# Patient Record
Sex: MALE | NOT HISPANIC OR LATINO | Employment: UNEMPLOYED | URBAN - METROPOLITAN AREA
[De-identification: names, ages, dates, MRNs, and addresses within clinical notes are randomized per-mention and may not be internally consistent; named-entity substitution may affect disease eponyms.]

---

## 2018-11-06 ENCOUNTER — RECORDS - HEALTHEAST (OUTPATIENT)
Dept: LAB | Facility: CLINIC | Age: 58
End: 2018-11-06

## 2018-11-06 LAB
ANION GAP SERPL CALCULATED.3IONS-SCNC: 9 MMOL/L (ref 5–18)
BASOPHILS # BLD AUTO: 0.1 THOU/UL (ref 0–0.2)
BASOPHILS NFR BLD AUTO: 1 % (ref 0–2)
BUN SERPL-MCNC: 36 MG/DL (ref 8–22)
CALCIUM SERPL-MCNC: 9.8 MG/DL (ref 8.5–10.5)
CHLORIDE BLD-SCNC: 103 MMOL/L (ref 98–107)
CO2 SERPL-SCNC: 26 MMOL/L (ref 22–31)
CREAT SERPL-MCNC: 2 MG/DL (ref 0.7–1.3)
EOSINOPHIL # BLD AUTO: 0.2 THOU/UL (ref 0–0.4)
EOSINOPHIL NFR BLD AUTO: 2 % (ref 0–6)
ERYTHROCYTE [DISTWIDTH] IN BLOOD BY AUTOMATED COUNT: 14.4 % (ref 11–14.5)
GFR SERPL CREATININE-BSD FRML MDRD: 34 ML/MIN/1.73M2
GLUCOSE BLD-MCNC: 111 MG/DL (ref 70–125)
HCT VFR BLD AUTO: 32.8 % (ref 40–54)
HGB BLD-MCNC: 10.4 G/DL (ref 14–18)
LYMPHOCYTES # BLD AUTO: 1.3 THOU/UL (ref 0.8–4.4)
LYMPHOCYTES NFR BLD AUTO: 14 % (ref 20–40)
MCH RBC QN AUTO: 30.9 PG (ref 27–34)
MCHC RBC AUTO-ENTMCNC: 31.7 G/DL (ref 32–36)
MCV RBC AUTO: 97 FL (ref 80–100)
MONOCYTES # BLD AUTO: 1 THOU/UL (ref 0–0.9)
MONOCYTES NFR BLD AUTO: 10 % (ref 2–10)
NEUTROPHILS # BLD AUTO: 6.9 THOU/UL (ref 2–7.7)
NEUTROPHILS NFR BLD AUTO: 73 % (ref 50–70)
PLATELET # BLD AUTO: 313 THOU/UL (ref 140–440)
PMV BLD AUTO: 10.8 FL (ref 8.5–12.5)
POTASSIUM BLD-SCNC: 5.9 MMOL/L (ref 3.5–5)
RBC # BLD AUTO: 3.37 MILL/UL (ref 4.4–6.2)
SODIUM SERPL-SCNC: 138 MMOL/L (ref 136–145)
WBC: 9.6 THOU/UL (ref 4–11)

## 2018-11-07 ENCOUNTER — RECORDS - HEALTHEAST (OUTPATIENT)
Dept: LAB | Facility: CLINIC | Age: 58
End: 2018-11-07

## 2018-11-07 LAB
ANION GAP SERPL CALCULATED.3IONS-SCNC: 13 MMOL/L (ref 5–18)
BUN SERPL-MCNC: 32 MG/DL (ref 8–22)
CALCIUM SERPL-MCNC: 9.9 MG/DL (ref 8.5–10.5)
CHLORIDE BLD-SCNC: 99 MMOL/L (ref 98–107)
CO2 SERPL-SCNC: 24 MMOL/L (ref 22–31)
CREAT SERPL-MCNC: 1.54 MG/DL (ref 0.7–1.3)
GFR SERPL CREATININE-BSD FRML MDRD: 46 ML/MIN/1.73M2
GLUCOSE BLD-MCNC: 184 MG/DL (ref 70–125)
POTASSIUM BLD-SCNC: 5.1 MMOL/L (ref 3.5–5)
SODIUM SERPL-SCNC: 136 MMOL/L (ref 136–145)

## 2018-11-09 ENCOUNTER — RECORDS - HEALTHEAST (OUTPATIENT)
Dept: LAB | Facility: CLINIC | Age: 58
End: 2018-11-09

## 2018-11-09 LAB
ANION GAP SERPL CALCULATED.3IONS-SCNC: 10 MMOL/L (ref 5–18)
BUN SERPL-MCNC: 26 MG/DL (ref 8–22)
CALCIUM SERPL-MCNC: 9.9 MG/DL (ref 8.5–10.5)
CHLORIDE BLD-SCNC: 103 MMOL/L (ref 98–107)
CO2 SERPL-SCNC: 24 MMOL/L (ref 22–31)
CREAT SERPL-MCNC: 1.3 MG/DL (ref 0.7–1.3)
GFR SERPL CREATININE-BSD FRML MDRD: 57 ML/MIN/1.73M2
GLUCOSE BLD-MCNC: 130 MG/DL (ref 70–125)
POTASSIUM BLD-SCNC: 4.9 MMOL/L (ref 3.5–5)
SODIUM SERPL-SCNC: 137 MMOL/L (ref 136–145)

## 2018-11-12 ENCOUNTER — RECORDS - HEALTHEAST (OUTPATIENT)
Dept: LAB | Facility: CLINIC | Age: 58
End: 2018-11-12

## 2018-11-12 LAB
ANION GAP SERPL CALCULATED.3IONS-SCNC: 7 MMOL/L (ref 5–18)
BUN SERPL-MCNC: 17 MG/DL (ref 8–22)
CALCIUM SERPL-MCNC: 9.6 MG/DL (ref 8.5–10.5)
CHLORIDE BLD-SCNC: 105 MMOL/L (ref 98–107)
CO2 SERPL-SCNC: 25 MMOL/L (ref 22–31)
CREAT SERPL-MCNC: 0.89 MG/DL (ref 0.7–1.3)
GFR SERPL CREATININE-BSD FRML MDRD: >60 ML/MIN/1.73M2
GLUCOSE BLD-MCNC: 119 MG/DL (ref 70–125)
POTASSIUM BLD-SCNC: 5.1 MMOL/L (ref 3.5–5)
SODIUM SERPL-SCNC: 137 MMOL/L (ref 136–145)

## 2018-11-15 ENCOUNTER — RECORDS - HEALTHEAST (OUTPATIENT)
Dept: LAB | Facility: CLINIC | Age: 58
End: 2018-11-15

## 2018-11-16 LAB
ANION GAP SERPL CALCULATED.3IONS-SCNC: 10 MMOL/L (ref 5–18)
BUN SERPL-MCNC: 32 MG/DL (ref 8–22)
CALCIUM SERPL-MCNC: 10 MG/DL (ref 8.5–10.5)
CHLORIDE BLD-SCNC: 101 MMOL/L (ref 98–107)
CO2 SERPL-SCNC: 24 MMOL/L (ref 22–31)
CREAT SERPL-MCNC: 1.24 MG/DL (ref 0.7–1.3)
GFR SERPL CREATININE-BSD FRML MDRD: 60 ML/MIN/1.73M2
GLUCOSE BLD-MCNC: 196 MG/DL (ref 70–125)
POTASSIUM BLD-SCNC: 4.7 MMOL/L (ref 3.5–5)
SODIUM SERPL-SCNC: 135 MMOL/L (ref 136–145)

## 2018-11-19 ENCOUNTER — RECORDS - HEALTHEAST (OUTPATIENT)
Dept: LAB | Facility: CLINIC | Age: 58
End: 2018-11-19

## 2018-11-19 LAB
ANION GAP SERPL CALCULATED.3IONS-SCNC: 8 MMOL/L (ref 5–18)
BUN SERPL-MCNC: 19 MG/DL (ref 8–22)
CALCIUM SERPL-MCNC: 9.9 MG/DL (ref 8.5–10.5)
CHLORIDE BLD-SCNC: 101 MMOL/L (ref 98–107)
CO2 SERPL-SCNC: 28 MMOL/L (ref 22–31)
CREAT SERPL-MCNC: 0.89 MG/DL (ref 0.7–1.3)
GFR SERPL CREATININE-BSD FRML MDRD: >60 ML/MIN/1.73M2
GLUCOSE BLD-MCNC: 182 MG/DL (ref 70–125)
POTASSIUM BLD-SCNC: 4.5 MMOL/L (ref 3.5–5)
SODIUM SERPL-SCNC: 137 MMOL/L (ref 136–145)

## 2019-08-14 ENCOUNTER — OFFICE VISIT (OUTPATIENT)
Dept: URGENT CARE | Facility: URGENT CARE | Age: 59
End: 2019-08-14
Payer: COMMERCIAL

## 2019-08-14 VITALS
OXYGEN SATURATION: 95 % | HEART RATE: 60 BPM | TEMPERATURE: 97.1 F | RESPIRATION RATE: 16 BRPM | BODY MASS INDEX: 42.74 KG/M2 | DIASTOLIC BLOOD PRESSURE: 94 MMHG | SYSTOLIC BLOOD PRESSURE: 204 MMHG | WEIGHT: 282 LBS | HEIGHT: 68 IN

## 2019-08-14 DIAGNOSIS — I16.1 HYPERTENSIVE EMERGENCY: Primary | ICD-10-CM

## 2019-08-14 PROCEDURE — 93000 ELECTROCARDIOGRAM COMPLETE: CPT | Performed by: PHYSICIAN ASSISTANT

## 2019-08-14 PROCEDURE — 99203 OFFICE O/P NEW LOW 30 MIN: CPT | Performed by: PHYSICIAN ASSISTANT

## 2019-08-14 RX ORDER — LANCETS 33 GAUGE
EACH MISCELLANEOUS
COMMUNITY
Start: 2019-05-09

## 2019-08-14 RX ORDER — FOLIC ACID 0.8 MG
1 TABLET ORAL
COMMUNITY
Start: 2016-10-12 | End: 2023-07-27

## 2019-08-14 RX ORDER — METFORMIN HCL 500 MG
1000 TABLET, EXTENDED RELEASE 24 HR ORAL
COMMUNITY
Start: 2019-08-06

## 2019-08-14 RX ORDER — CETIRIZINE HYDROCHLORIDE 10 MG/1
10 TABLET ORAL DAILY
COMMUNITY
Start: 2019-01-14

## 2019-08-14 RX ORDER — ALBUTEROL SULFATE 90 UG/1
2 AEROSOL, METERED RESPIRATORY (INHALATION) EVERY 6 HOURS PRN
COMMUNITY
Start: 2014-08-27

## 2019-08-14 RX ORDER — HYDROCHLOROTHIAZIDE 25 MG/1
25 TABLET ORAL
COMMUNITY
Start: 2011-10-28 | End: 2023-07-27

## 2019-08-14 RX ORDER — DILTIAZEM HYDROCHLORIDE 240 MG/1
240 CAPSULE, EXTENDED RELEASE ORAL EVERY EVENING
COMMUNITY
Start: 2019-08-06

## 2019-08-14 RX ORDER — MONTELUKAST SODIUM 10 MG/1
10 TABLET ORAL EVERY MORNING
COMMUNITY
Start: 2019-08-06

## 2019-08-14 ASSESSMENT — MIFFLIN-ST. JEOR: SCORE: 2063.64

## 2019-08-14 NOTE — PROGRESS NOTES
"Patient presents with:  Hypertension: pt noticed he had high blood pressure this morning at 190/100. Pt had a stroke 5 years ago. Pt is also feeling lightheaded    Did not take his diltiazem last night.      He has a headache and he feels off balance.  \"Feels different\"      SUBJECTIVE:   Vargas Dent is a 59 year old male presenting with a chief complaint of:  1) patient sent to  by his home health nurse for 200/104 blood pressure this morning.   2) he complains of a frontal headache and dizziness with an increase in his underlying balance problems.    3) some shortness of breath but no chest pressure or pain.     Past Medical History:   Diagnosis Date     CVA (cerebral infarction)      Hyperlipidemia      Hypertension      Patient Active Problem List   Diagnosis     HTN (hypertension)     Hyperlipidemia     Cerebral artery occlusion with cerebral infarction (H)     Hemiplegia of nondominant side, late effect of cerebrovascular disease (H)     Facial weakness due to cerebrovascular disease(438.83)     Dysarthria as late effect of cerebrovascular disease     Diplopia     Pain in joint, pelvic region and thigh     Disturbances of vision, late effect of cerebrovascular disease     Pain in joint, lower leg     Hiccough     Physical deconditioning     Hiccup     Cerebral infarction (H)     Constipation     Dysphagia     DJD (degenerative joint disease) of knee     DDD (degenerative disc disease)     Allergic rhinitis     Social History     Tobacco Use     Smoking status: Never Smoker     Smokeless tobacco: Never Used   Substance Use Topics     Alcohol use: Not on file       ROS:  CONSTITUTIONAL:NEGATIVE for fever, chills, change in weight  INTEGUMENTARY/SKIN: NEGATIVE for worrisome rashes, moles or lesions  ENT/MOUTH: NEGATIVE for ear, mouth and throat problems  RESP:as above  CV: NEGATIVE for chest pain, palpitations or peripheral edema  NEURO: as above  Review of systems negative except as stated " "above.    OBJECTIVE  :BP (!) 204/94 (BP Location: Right arm, Patient Position: Sitting, Cuff Size: Adult Regular)   Pulse 60   Temp 97.1  F (36.2  C) (Oral)   Resp 16   Ht 1.727 m (5' 8\")   Wt 127.9 kg (282 lb)   SpO2 95%   BMI 42.88 kg/m    GENERAL APPEARANCE: healthy, alert and no distress  RESP: lungs clear to auscultation - no rales, rhonchi or wheezes  CV: regular rates and rhythm, normal S1 S2, no murmur noted  NEURO: Normal strength and tone, sensory exam grossly normal,  normal speech and mentation  SKIN: no suspicious lesions or rashes     (I16.1) Hypertensive emergency  (primary encounter diagnosis)  Comment: with headache and dizziness, concern for CVA  Plan: EKG 12-lead complete w/read - Clinics        Patient to be evaluated further in ED, transport by ambulance.      Patient expresses understanding and agreement with the assessment and plan as above.      "

## 2023-07-27 ENCOUNTER — APPOINTMENT (OUTPATIENT)
Dept: CT IMAGING | Facility: CLINIC | Age: 63
End: 2023-07-27
Attending: EMERGENCY MEDICINE
Payer: COMMERCIAL

## 2023-07-27 ENCOUNTER — HOSPITAL ENCOUNTER (OUTPATIENT)
Facility: CLINIC | Age: 63
Setting detail: OBSERVATION
Discharge: HOME | End: 2023-07-30
Attending: EMERGENCY MEDICINE | Admitting: HOSPITALIST
Payer: COMMERCIAL

## 2023-07-27 ENCOUNTER — APPOINTMENT (OUTPATIENT)
Dept: MRI IMAGING | Facility: CLINIC | Age: 63
End: 2023-07-27
Attending: EMERGENCY MEDICINE
Payer: COMMERCIAL

## 2023-07-27 DIAGNOSIS — S22.089A CLOSED FRACTURE OF ELEVENTH THORACIC VERTEBRA, UNSPECIFIED FRACTURE MORPHOLOGY, INITIAL ENCOUNTER (H): ICD-10-CM

## 2023-07-27 DIAGNOSIS — K59.00 CONSTIPATION, UNSPECIFIED CONSTIPATION TYPE: Primary | ICD-10-CM

## 2023-07-27 DIAGNOSIS — S22.089A CLOSED FRACTURE OF TWELFTH THORACIC VERTEBRA, UNSPECIFIED FRACTURE MORPHOLOGY, INITIAL ENCOUNTER (H): ICD-10-CM

## 2023-07-27 DIAGNOSIS — R07.9 CHEST PAIN, UNSPECIFIED TYPE: ICD-10-CM
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
## 2023-07-27 LAB
ALBUMIN SERPL BCG-MCNC: 4.1 G/DL (ref 3.5–5.2)
ALP SERPL-CCNC: 95 U/L (ref 40–129)
ALT SERPL W P-5'-P-CCNC: 14 U/L (ref 0–70)
ANION GAP SERPL CALCULATED.3IONS-SCNC: 15 MMOL/L (ref 7–15)
AST SERPL W P-5'-P-CCNC: 11 U/L (ref 0–45)
ATRIAL RATE - MUSE: 73 BPM
BASOPHILS # BLD AUTO: 0 10E3/UL (ref 0–0.2)
BASOPHILS NFR BLD AUTO: 0 %
BILIRUB SERPL-MCNC: 0.6 MG/DL
BUN SERPL-MCNC: 19.9 MG/DL (ref 8–23)
CALCIUM SERPL-MCNC: 8.8 MG/DL (ref 8.8–10.2)
CHLORIDE SERPL-SCNC: 102 MMOL/L (ref 98–107)
CREAT SERPL-MCNC: 1.11 MG/DL (ref 0.67–1.17)
DEPRECATED HCO3 PLAS-SCNC: 18 MMOL/L (ref 22–29)
DIASTOLIC BLOOD PRESSURE - MUSE: NORMAL MMHG
EOSINOPHIL # BLD AUTO: 0.4 10E3/UL (ref 0–0.7)
EOSINOPHIL NFR BLD AUTO: 4 %
ERYTHROCYTE [DISTWIDTH] IN BLOOD BY AUTOMATED COUNT: 14.6 % (ref 10–15)
GFR SERPL CREATININE-BSD FRML MDRD: 75 ML/MIN/1.73M2
GLUCOSE SERPL-MCNC: 189 MG/DL (ref 70–99)
HCT VFR BLD AUTO: 38 % (ref 40–53)
HGB BLD-MCNC: 12.4 G/DL (ref 13.3–17.7)
HOLD SPECIMEN: NORMAL
HOLD SPECIMEN: NORMAL
IMM GRANULOCYTES # BLD: 0 10E3/UL
IMM GRANULOCYTES NFR BLD: 0 %
INTERPRETATION ECG - MUSE: NORMAL
LYMPHOCYTES # BLD AUTO: 1.1 10E3/UL (ref 0.8–5.3)
LYMPHOCYTES NFR BLD AUTO: 11 %
MCH RBC QN AUTO: 30.4 PG (ref 26.5–33)
MCHC RBC AUTO-ENTMCNC: 32.6 G/DL (ref 31.5–36.5)
MCV RBC AUTO: 93 FL (ref 78–100)
MONOCYTES # BLD AUTO: 1.4 10E3/UL (ref 0–1.3)
MONOCYTES NFR BLD AUTO: 14 %
NEUTROPHILS # BLD AUTO: 6.9 10E3/UL (ref 1.6–8.3)
NEUTROPHILS NFR BLD AUTO: 71 %
NRBC # BLD AUTO: 0 10E3/UL
NRBC BLD AUTO-RTO: 0 /100
P AXIS - MUSE: 10 DEGREES
PLATELET # BLD AUTO: 197 10E3/UL (ref 150–450)
POTASSIUM SERPL-SCNC: 4.2 MMOL/L (ref 3.4–5.3)
PR INTERVAL - MUSE: 220 MS
PROT SERPL-MCNC: 7.3 G/DL (ref 6.4–8.3)
QRS DURATION - MUSE: 84 MS
QT - MUSE: 384 MS
QTC - MUSE: 423 MS
R AXIS - MUSE: 30 DEGREES
RADIOLOGIST FLAGS: ABNORMAL
RADIOLOGIST FLAGS: ABNORMAL
RBC # BLD AUTO: 4.08 10E6/UL (ref 4.4–5.9)
SODIUM SERPL-SCNC: 135 MMOL/L (ref 136–145)
SYSTOLIC BLOOD PRESSURE - MUSE: NORMAL MMHG
T AXIS - MUSE: 11 DEGREES
TROPONIN T SERPL HS-MCNC: 16 NG/L
TROPONIN T SERPL HS-MCNC: 18 NG/L
VENTRICULAR RATE- MUSE: 73 BPM
WBC # BLD AUTO: 9.9 10E3/UL (ref 4–11)

## 2023-07-27 PROCEDURE — G0378 HOSPITAL OBSERVATION PER HR: HCPCS

## 2023-07-27 PROCEDURE — 999N000104 CT LUMBAR SPINE RECONSTRUCTED

## 2023-07-27 PROCEDURE — 74177 CT ABD & PELVIS W/CONTRAST: CPT

## 2023-07-27 PROCEDURE — 80053 COMPREHEN METABOLIC PANEL: CPT | Performed by: EMERGENCY MEDICINE

## 2023-07-27 PROCEDURE — 96376 TX/PRO/DX INJ SAME DRUG ADON: CPT | Mod: XU

## 2023-07-27 PROCEDURE — 250N000009 HC RX 250: Performed by: EMERGENCY MEDICINE

## 2023-07-27 PROCEDURE — 93005 ELECTROCARDIOGRAM TRACING: CPT

## 2023-07-27 PROCEDURE — 84484 ASSAY OF TROPONIN QUANT: CPT | Performed by: EMERGENCY MEDICINE

## 2023-07-27 PROCEDURE — 72125 CT NECK SPINE W/O DYE: CPT

## 2023-07-27 PROCEDURE — 36415 COLL VENOUS BLD VENIPUNCTURE: CPT | Performed by: EMERGENCY MEDICINE

## 2023-07-27 PROCEDURE — 96376 TX/PRO/DX INJ SAME DRUG ADON: CPT

## 2023-07-27 PROCEDURE — 250N000013 HC RX MED GY IP 250 OP 250 PS 637: Performed by: HOSPITALIST

## 2023-07-27 PROCEDURE — 85025 COMPLETE CBC W/AUTO DIFF WBC: CPT | Performed by: EMERGENCY MEDICINE

## 2023-07-27 PROCEDURE — 250N000011 HC RX IP 250 OP 636: Performed by: EMERGENCY MEDICINE

## 2023-07-27 PROCEDURE — 96374 THER/PROPH/DIAG INJ IV PUSH: CPT

## 2023-07-27 PROCEDURE — 99203 OFFICE O/P NEW LOW 30 MIN: CPT | Performed by: NURSE PRACTITIONER

## 2023-07-27 PROCEDURE — 999N000157 HC STATISTIC RCP TIME EA 10 MIN

## 2023-07-27 PROCEDURE — 999N000104 CT THORACIC SPINE RECONSTRUCTED

## 2023-07-27 PROCEDURE — 99223 1ST HOSP IP/OBS HIGH 75: CPT | Performed by: HOSPITALIST

## 2023-07-27 PROCEDURE — 72146 MRI CHEST SPINE W/O DYE: CPT

## 2023-07-27 PROCEDURE — 94660 CPAP INITIATION&MGMT: CPT

## 2023-07-27 RX ORDER — BISACODYL 10 MG
10 SUPPOSITORY, RECTAL RECTAL DAILY PRN
Status: DISCONTINUED | OUTPATIENT
Start: 2023-07-27 | End: 2023-07-30 | Stop reason: HOSPADM

## 2023-07-27 RX ORDER — IOPAMIDOL 755 MG/ML
135 INJECTION, SOLUTION INTRAVASCULAR ONCE
Status: COMPLETED | OUTPATIENT
Start: 2023-07-27 | End: 2023-07-27

## 2023-07-27 RX ORDER — PREGABALIN 150 MG/1
150 CAPSULE ORAL 3 TIMES DAILY
COMMUNITY

## 2023-07-27 RX ORDER — AMOXICILLIN 250 MG
1 CAPSULE ORAL 2 TIMES DAILY
Status: DISCONTINUED | OUTPATIENT
Start: 2023-07-27 | End: 2023-07-30 | Stop reason: HOSPADM

## 2023-07-27 RX ORDER — IPRATROPIUM BROMIDE 42 UG/1
2 SPRAY, METERED NASAL 2 TIMES DAILY
COMMUNITY

## 2023-07-27 RX ORDER — ACETAMINOPHEN 325 MG/1
975 TABLET ORAL EVERY 8 HOURS
Status: DISCONTINUED | OUTPATIENT
Start: 2023-07-27 | End: 2023-07-30 | Stop reason: HOSPADM

## 2023-07-27 RX ORDER — ONDANSETRON 4 MG/1
4 TABLET, ORALLY DISINTEGRATING ORAL EVERY 6 HOURS PRN
Status: DISCONTINUED | OUTPATIENT
Start: 2023-07-27 | End: 2023-07-30 | Stop reason: HOSPADM

## 2023-07-27 RX ORDER — LIDOCAINE 4 G/G
1 PATCH TOPICAL
Status: DISCONTINUED | OUTPATIENT
Start: 2023-07-28 | End: 2023-07-30 | Stop reason: HOSPADM

## 2023-07-27 RX ORDER — CYCLOBENZAPRINE HCL 10 MG
10 TABLET ORAL EVERY 8 HOURS PRN
Status: DISCONTINUED | OUTPATIENT
Start: 2023-07-27 | End: 2023-07-30 | Stop reason: HOSPADM

## 2023-07-27 RX ORDER — ONDANSETRON 2 MG/ML
4 INJECTION INTRAMUSCULAR; INTRAVENOUS EVERY 6 HOURS PRN
Status: DISCONTINUED | OUTPATIENT
Start: 2023-07-27 | End: 2023-07-30 | Stop reason: HOSPADM

## 2023-07-27 RX ORDER — NALOXONE HYDROCHLORIDE 0.4 MG/ML
0.2 INJECTION, SOLUTION INTRAMUSCULAR; INTRAVENOUS; SUBCUTANEOUS
Status: DISCONTINUED | OUTPATIENT
Start: 2023-07-27 | End: 2023-07-30 | Stop reason: HOSPADM

## 2023-07-27 RX ORDER — AMOXICILLIN 250 MG
2 CAPSULE ORAL 2 TIMES DAILY
Status: DISCONTINUED | OUTPATIENT
Start: 2023-07-27 | End: 2023-07-30 | Stop reason: HOSPADM

## 2023-07-27 RX ORDER — HYDROMORPHONE HCL IN WATER/PF 6 MG/30 ML
0.2 PATIENT CONTROLLED ANALGESIA SYRINGE INTRAVENOUS
Status: DISCONTINUED | OUTPATIENT
Start: 2023-07-27 | End: 2023-07-30 | Stop reason: HOSPADM

## 2023-07-27 RX ORDER — NALOXONE HYDROCHLORIDE 0.4 MG/ML
0.4 INJECTION, SOLUTION INTRAMUSCULAR; INTRAVENOUS; SUBCUTANEOUS
Status: DISCONTINUED | OUTPATIENT
Start: 2023-07-27 | End: 2023-07-30 | Stop reason: HOSPADM

## 2023-07-27 RX ORDER — ASPIRIN 325 MG
325 TABLET ORAL DAILY
COMMUNITY

## 2023-07-27 RX ORDER — HYDRALAZINE HYDROCHLORIDE 20 MG/ML
10 INJECTION INTRAMUSCULAR; INTRAVENOUS EVERY 4 HOURS PRN
Status: DISCONTINUED | OUTPATIENT
Start: 2023-07-27 | End: 2023-07-30 | Stop reason: HOSPADM

## 2023-07-27 RX ORDER — TIOTROPIUM BROMIDE 18 UG/1
18 CAPSULE ORAL; RESPIRATORY (INHALATION) DAILY
COMMUNITY

## 2023-07-27 RX ORDER — LOSARTAN POTASSIUM 100 MG/1
100 TABLET ORAL DAILY
COMMUNITY

## 2023-07-27 RX ADMIN — HYDROMORPHONE HYDROCHLORIDE 1 MG: 1 INJECTION, SOLUTION INTRAMUSCULAR; INTRAVENOUS; SUBCUTANEOUS at 06:56

## 2023-07-27 RX ADMIN — SODIUM CHLORIDE 80 ML: 9 INJECTION, SOLUTION INTRAVENOUS at 07:31

## 2023-07-27 RX ADMIN — IOPAMIDOL 135 ML: 755 INJECTION, SOLUTION INTRAVENOUS at 07:29

## 2023-07-27 RX ADMIN — OXYCODONE HYDROCHLORIDE 2.5 MG: 5 TABLET ORAL at 21:08

## 2023-07-27 RX ADMIN — HYDROMORPHONE HYDROCHLORIDE 1 MG: 1 INJECTION, SOLUTION INTRAMUSCULAR; INTRAVENOUS; SUBCUTANEOUS at 12:08

## 2023-07-27 RX ADMIN — CYCLOBENZAPRINE 10 MG: 10 TABLET, FILM COATED ORAL at 16:31

## 2023-07-27 RX ADMIN — OXYCODONE HYDROCHLORIDE 2.5 MG: 5 TABLET ORAL at 16:31

## 2023-07-27 RX ADMIN — SENNOSIDES AND DOCUSATE SODIUM 1 TABLET: 50; 8.6 TABLET ORAL at 20:56

## 2023-07-27 ASSESSMENT — ACTIVITIES OF DAILY LIVING (ADL)
ADLS_ACUITY_SCORE: 33
ADLS_ACUITY_SCORE: 35
ADLS_ACUITY_SCORE: 35
ADLS_ACUITY_SCORE: 33
ADLS_ACUITY_SCORE: 33
ADLS_ACUITY_SCORE: 35
ADLS_ACUITY_SCORE: 33
ADLS_ACUITY_SCORE: 33
ADLS_ACUITY_SCORE: 35

## 2023-07-27 NOTE — PHARMACY-ADMISSION MEDICATION HISTORY
Pharmacist Admission Medication History    Admission medication history is complete. The information provided in this note is only as accurate as the sources available at the time of the update.    Medication reconciliation/reorder completed by provider prior to medication history? No    Information Source(s): Patient and CareEverywhere/SureScripts via in-person    Pertinent Information: None    Changes made to PTA medication list:  Added: Pregabalin, Ipratropium Nasal spray, Spiriva Inhaler,   Deleted: amlodipine, atorvastatin, carvedilol, gabapentin, hydrochlorothiazide, losartan, naproxen, pantoprazole, oxycodone, miralax, senns  Changed: Aspirin to 325 mg daily, Metformin to 1000 mg daily      Allergies reviewed with patient and updates made in EHR:  Done by RN    Medication History Completed By: Gayla Galeano [facility] 7/27/2023 10:30 AM    Prior to Admission medications    Medication Sig Last Dose Taking? Auth Provider Long Term End Date   ACETAMINOPHEN PO Take 650 mg by mouth every 4 hours as needed for pain PRN Yes Reported, Patient     albuterol (PROAIR HFA/PROVENTIL HFA/VENTOLIN HFA) 108 (90 Base) MCG/ACT inhaler Inhale 2 puffs into the lungs every 6 hours as needed PRN Yes Reported, Patient Yes    aspirin (ASA) 325 MG tablet Take 325 mg by mouth daily 7/26/2023 at took x 3 Yes Unknown, Entered By History     cetirizine (ZYRTEC) 10 MG tablet Take 10 mg by mouth daily 7/26/2023 Yes Reported, Patient     diltiazem ER (DILT-XR) 240 MG 24 hr ER beaded capsule Take 240 mg by mouth every evening 7/26/2023 Yes Reported, Patient Yes    fluticasone (FLONASE) 50 MCG/ACT nasal spray Spray 1 spray into both nostrils 2 times daily 7/26/2023 Yes Reported, Patient     ipratropium (ATROVENT) 0.06 % nasal spray Spray 2 sprays into both nostrils 2 times daily  Yes Unknown, Entered By History     losartan (COZAAR) 100 MG tablet Take 100 mg by mouth daily 7/26/2023 Yes Unknown, Entered By History No    metFORMIN  (GLUCOPHAGE-XR) 500 MG 24 hr tablet Take 1,000 mg by mouth daily (with dinner) 7/26/2023 Yes Reported, Patient Yes    mometasone-formoterol (DULERA) 200-5 MCG/ACT inhaler Inhale 2 puffs into the lungs 2 times daily 7/26/2023 Yes Reported, Patient Yes    montelukast (SINGULAIR) 10 MG tablet Take 10 mg by mouth every morning 7/26/2023 Yes Reported, Patient Yes    pregabalin (LYRICA) 150 MG capsule Take 150 mg by mouth 3 times daily 7/26/2023 Yes Unknown, Entered By History Yes    tiotropium (SPIRIVA) 18 MCG inhaled capsule Inhale 18 mcg into the lungs daily  Yes Unknown, Entered By History Yes    blood glucose (NO BRAND SPECIFIED) test strip Dispense one touch ultra strips. Test 2 times per day ** Pharmacy allowed to substitute per Patients Insurance.   Reported, Patient     ONETOUCH DELICA LANCETS 33G MISC Dispense lancets to fit device. Test 2 times per day ** Pharmacy allowed to substitute per patient's insurance.   Reported, Patient

## 2023-07-27 NOTE — CONSULTS
[facility]    Neurosurgery Consultation     Date of Admission:  7/27/2023  Date of Consult (When I saw the patient): 07/27/23    Assessment & Plan   Vargas Dent is a 62 year old male who was admitted on 7/27/2023 with back pain s/p fall. Radiographic findings include a T12 vertebral body fracture extending to the right T11 facet and T11 bilateral lamina.       Plan:   - Orthotics consult for TLSO brace    - Recumbent and upright XR   - Avoid heavy lifting, bending, twisting  - Continue pain control measures as needed  - Appreciate assistance from specialties       I have discussed the following assessment and plan with Dr. Salcido.     Ruth Marx CNP  [facility]  [facility]  [address]  [place] [address]  Tel [phone]  Pager [phone]    Code Status    Full Code    Reason for Consult   Reason for consult: I was asked by Dr. Green to evaluate this patient for fall with T11-12 fracture.    Primary Care Physician   Tabitha Montoya    Chief Complaint   Fall, back pain     History is obtained from the patient and electronic health record    History of Present Illness   Vargas Dent is a 62 year old male who presents with low back pain s/p mechanical fall in the garage 2 days ago. He developed  low back pain that radiates to left sided abdomen and mild pain in left groin. Pain increases with activity. He denies numbness, saddle anesthesia, bowel/bladder changes, or new weakness. He reports baseline  left leg weakness s/p stroke in 2014.     CT THORACIC SPINE RECONSTRUCTED,   CT LUMBAR SPINE RECONSTRUCTED  7/27/2023 7:49 AM   IMPRESSION:  1. Three column injury with fractures involving the T12 vertebral  body, extending to the right T11 facet and T11 bilateral lamina. No  retropulsed fragments into the spinal canal. No high-grade canal or  neural foraminal stenosis.  2. No fracture or traumatic subluxation of the lumbar  spine.  Multilevel lumbar spondylosis. No high-grade canal stenosis.  3. See same day/same time as CT of the chest, abdomen and pelvis for  those images and report.    MR THORACIC SPINE W/O CONTRAST 7/27/2023 12:41 PM   IMPRESSION: Redemonstration of three column T12 vertebral body and T11  posterior element fractures. No cord contusion.       Past Medical History   I have reviewed this patient's medical history and updated it with pertinent information if needed.   Past Medical History:   Diagnosis Date    CVA (cerebral infarction)     Hyperlipidemia     Hypertension        Past Surgical History   I have reviewed this patient's surgical history and updated it with pertinent information if needed.  No past surgical history on file.    Prior to Admission Medications   Prior to Admission Medications   Prescriptions Last Dose Informant Patient Reported? Taking?   ACETAMINOPHEN PO PRN  Yes Yes   Sig: Take 650 mg by mouth every 4 hours as needed for pain   ONETOUCH DELICA LANCETS 33G MISC   Yes No   Sig: Dispense lancets to fit device. Test 2 times per day ** Pharmacy allowed to substitute per patient's insurance.   albuterol (PROAIR HFA/PROVENTIL HFA/VENTOLIN HFA) 108 (90 Base) MCG/ACT inhaler PRN  Yes Yes   Sig: Inhale 2 puffs into the lungs every 6 hours as needed   aspirin (ASA) 325 MG tablet 7/26/2023 at took x 3  Yes Yes   Sig: Take 325 mg by mouth daily   blood glucose (NO BRAND SPECIFIED) test strip   Yes No   Sig: Dispense one touch ultra strips. Test 2 times per day ** Pharmacy allowed to substitute per Patients Insurance.   cetirizine (ZYRTEC) 10 MG tablet 7/26/2023  Yes Yes   Sig: Take 10 mg by mouth daily   diltiazem ER (DILT-XR) 240 MG 24 hr ER beaded capsule 7/26/2023  Yes Yes   Sig: Take 240 mg by mouth every evening   fluticasone (FLONASE) 50 MCG/ACT nasal spray 7/26/2023  Yes Yes   Sig: Spray 1 spray into both nostrils 2 times daily   ipratropium (ATROVENT) 0.06 % nasal spray   Yes Yes   Sig: Spray 2  "sprays into both nostrils 2 times daily   losartan (COZAAR) 100 MG tablet 7/26/2023  Yes Yes   Sig: Take 100 mg by mouth daily   metFORMIN (GLUCOPHAGE-XR) 500 MG 24 hr tablet 7/26/2023  Yes Yes   Sig: Take 1,000 mg by mouth daily (with dinner)   mometasone-formoterol (DULERA) 200-5 MCG/ACT inhaler 7/26/2023  Yes Yes   Sig: Inhale 2 puffs into the lungs 2 times daily   montelukast (SINGULAIR) 10 MG tablet 7/26/2023  Yes Yes   Sig: Take 10 mg by mouth every morning   pregabalin (LYRICA) 150 MG capsule 7/26/2023  Yes Yes   Sig: Take 150 mg by mouth 3 times daily   tiotropium (SPIRIVA) 18 MCG inhaled capsule   Yes Yes   Sig: Inhale 18 mcg into the lungs daily      Facility-Administered Medications: None     Allergies   Allergies   Allergen Reactions    Beta Adrenergic Blockers Other (See Comments)     Allergy injections       Social History   I have reviewed this patient's social history and updated it with pertinent information if needed. Vargas Dent  reports that he has never smoked. He has never used smokeless tobacco.    Family History   I have reviewed this patient's family history and updated it with pertinent information if needed.   No family history on file.    Review of Systems   10 point ROS negative other than symptoms noted in HPI.    Physical Exam   Temp: 97.5  F (36.4  C) Temp src: Oral BP: 117/71 Pulse: 80   Resp: 18 SpO2: 95 % O2 Device: None (Room air)    Vital Signs with Ranges  Temp:  [97.5  F (36.4  C)-99.1  F (37.3  C)] 97.5  F (36.4  C)  Pulse:  [66-80] 80  Resp:  [12-18] 18  BP: (117-138)/(71-91) 117/71  SpO2:  [93 %-95 %] 95 %  280 lbs 0 oz     , Blood pressure 117/71, pulse 80, temperature 97.5  F (36.4  C), temperature source Oral, resp. rate 18, height 1.702 m (5' 7\"), weight 127 kg (280 lb), SpO2 95 %.  280 lbs 0 oz      NEUROLOGICAL EXAMINATION:   Mental status:  Alert and Oriented x 3, speech is fluent.  Cranial nerves:  II-XII intact.   Motor:    Baseline left hip and knee weakness " due to prior stroke   Able to lift left leg off the bed    RLE 5/5  Bilateral DF/PF 5/5    Sensation:  Intact to light touch   Reflexes:   Negative Clonus    Examination reveals tenderness to palpation of lower thoracic spine.   Straight leg raise is negative bilaterally.       Data     CBC RESULTS:   Recent Labs   Lab Test 07/27/23  0653   WBC 9.9   RBC 4.08*   HGB 12.4*   HCT 38.0*   MCV 93   MCH 30.4   MCHC 32.6   RDW 14.6        Basic Metabolic Panel:  Lab Results   Component Value Date     07/27/2023      Lab Results   Component Value Date    POTASSIUM 4.2 07/27/2023    POTASSIUM 4.5 11/19/2018     Lab Results   Component Value Date    CHLORIDE 102 07/27/2023    CHLORIDE 101 11/19/2018     Lab Results   Component Value Date    HARIKA 8.8 07/27/2023     Lab Results   Component Value Date    CO2 18 07/27/2023    CO2 28 11/19/2018     Lab Results   Component Value Date    BUN 19.9 07/27/2023    BUN 19 11/19/2018     Lab Results   Component Value Date    CR 1.11 07/27/2023     Lab Results   Component Value Date     07/27/2023     11/19/2018     INR:  No results found for: INR

## 2023-07-27 NOTE — PROGRESS NOTES
Orientation/Cognitive: A&O hx CVA. Baseline deficit with balance and L side weaker. Pt states R side has less feeling, and slightly garbled speech  Observation Goals (Met/ Not Met): not met  Mobility Level/Assist Equipment: bedrest  Fall Risk (Y/N): Y  Behavior Concerns: N  Pain Management: oxycodone, flexeril, dilaudid  Tele/VS/O2: VSS on RA. cpap  ABNL Lab/BG: Na   Diet: reg  Bowel/Bladder: cont   Skin Concerns: small abrasion R hip open to air  Drains/Devices: no  Tests/Procedures for next shift: thoracic xray. Neurosurg. Orthosis. PT SW  Anticipated DC date & active delays: tbd  Patient Stated Goal for Today: pain relief

## 2023-07-27 NOTE — ED NOTES
"[facility]  ED Nurse Handoff Report    ED Chief complaint: Fall (Pt fell on Tuesday and fell into a wall. Now c/o LLQ abd pain. Concerned that he may be septic.)      ED Diagnosis:   Final diagnoses:   None       Code Status: not addressed     Allergies:   Allergies   Allergen Reactions    Beta Adrenergic Blockers Other (See Comments)     Allergy injections       Patient Story: Pt tripped fell on Tuesday and landed forward. Pt c/o pain almost all over. Concerned that he ruptured his bowel and being septic.     Focused Assessment:  as above     Treatments and/or interventions provided: dilaudid, bedrest   Patient's response to treatments and/or interventions:     To be done/followed up on inpatient unit:  unknown     Does this patient have any cognitive concerns?:  no    Activity level - Baseline/Home:  Independent  Activity Level - Current:    bedrest     Patient's Preferred language: English   Needed?: No    Isolation: None  Infection: Not Applicable  Patient tested for COVID 19 prior to admission: NO  Bariatric?: No    Vital Signs:   Vitals:    07/27/23 0535 07/27/23 0540   BP: 117/84    Pulse: 78    Resp: 18    Temp:  98  F (36.7  C)   TempSrc:  Oral   SpO2: 94%    Weight: 127 kg (280 lb)    Height: 1.702 m (5' 7\")        Cardiac Rhythm:     Was the PSS-3 completed:   Yes  What interventions are required if any?               Family Comments:   OBS brochure/video discussed/provided to patient/family:               Name of person given brochure if not patient:               Relationship to patient:     For the majority of the shift this patient's behavior was .   Behavioral interventions performed were .    ED NURSE PHONE NUMBER: [phone]        "

## 2023-07-27 NOTE — ED PROVIDER NOTES
"  History     Chief Complaint:  Fall     The history is provided by the patient.   Vargas Dent is a 62 year old male with history of sepsis, CKD, hypertension, and CVA who presents to the ED for evaluation of a fall. The patient reports he tripped over a garden hose two days ago, falling face forward onto his chest and \"scorpioning\" with his legs coming up over him. He did not lose consciousness with the fall. Since the fall he has had lower back pain with radiation into his left leg and testicle, rated 3/10, as well as left lower abdominal pain. Today he had an episode of dull left chest pain, lasting five minutes, lightheadedness, and fatigue that he states is similar to previous episode of sepsis. The patient denies vomiting, urinary symptoms, head injury or loss of consciousness.    Independent Historian:   None - Patient Only    Review of External Notes:       Medications:    Aspirin 325 mg  Naprosyn  Vitamin D3  Glucophage  Cozaar  Miralax  Lipitor  Zyrtec  Singulair  Dulera  Flonase  Atrovent  Sudafed  Lyrica  Spiriva  Ventolin    Past Medical History:    Hypertension  Hyperlipidemia  CVA  TIA  Photophobia in bilateral eyes  Myopia of both eyes with astigmatism  Stage 2 CKD  Asthma  Class 3 Obesity  Sebirrhea  Dyslipidemia  Prediabetes  Vitamin D deficiency  Constipation  Internal Hemorrhoids  Dysphagia  Cataracts, bilateral  Diplopia  Diverticulosis  Allergic Rhinitis  Alcohol abuse  Major neurocognitive disorder  Adenomatous polyp of colon  Osteoarthritis  ARTHUR  Carpal tunnel of right wrist    Past Surgical History:    Tonsillectomy  Appendectomy     Physical Exam   Patient Vitals for the past 24 hrs:   BP Temp Temp src Pulse Resp SpO2 Height Weight   07/27/23 0540 -- 98  F (36.7  C) Oral -- -- -- -- --   07/27/23 0535 117/84 -- -- 78 18 94 % 1.702 m (5' 7\") 127 kg (280 lb)        Physical Exam  Constitutional: Well appearing.  HEENT: Atraumatic.  PERRL.  EOMI.  Moist mucous membranes.  Neck: Soft.  " Supple.  Mild midline tenderness to palpation of the inferior cervical spine.  Cardiac: Regular rate and rhythm.  No murmur or rub.  Respiratory: Clear to auscultation bilaterally.  No respiratory distress.    Abdomen: Soft and nontender.  Nondistended.  Musculoskeletal: There is tenderness of the inferior thoracic midline spine but no bony step-off.  No edema.  Normal range of motion.  Neurologic: Alert and oriented x3.  Normal tone and bulk.  Cranial nerves II through XII intact.  5/5 strength in bilateral upper and right lower extremities.  4/5 strength in the left leg.  Sensation to light touch intact throughout.    Skin: No rashes.  No edema.  Psych: Normal affect.  Normal behavior.        Emergency Department Course   ECG  ECG taken at 0705, ECG read at 0710  No STEMI  Sinus rhythm with sinus arrhythmia with 1st degree AV block  Septal Infarct, age undetermined  Abnormal ECG   No change as compared to prior, dated 8/14/19.  Rate 73 bpm. DC interval 220 ms. QRS duration 84 ms. QT/QTc 384/423 ms. P-R-T axes 10/30/11.     Imaging:  CT Chest/Abdomen/Pelvis w Contrast   Preliminary Result   IMPRESSION:   1.  Acute-appearing nondisplaced fractures at T11 and T12 vertebral   bodies. No other visible displaced fractures. However, this   examination is not specifically tailored for spine assessment. Refer   to CT of the spine for further details and other potential occult   fractures.   2.  No other specific acute traumatic abnormality identified.    3.  Cholelithiasis.      CT Lumbar Spine Reconstructed   Preliminary Result   Abnormal   IMPRESSION:   1. T12 vertebral body fracture extending to the right T11 facet and   T11 bilateral lamina. No retropulsed fragments into the spinal canal.   2. No fracture or traumatic subluxation of the lumbar spine.   Multilevel lumbar spondylosis. No high-grade canal stenosis.   3. See same day/same time as CT of the chest, abdomen and pelvis for   those images and report.       [Urgent Result: Thoracic vertebral body fracture]      Finding was identified on 7/27/2023 8:07 AM.       Dr. Sharma was contacted by Dr. Donato at 7/27/2023 8:22 AM and   verbalized understanding of the urgent finding.       CT Thoracic Spine Reconstructed   Preliminary Result   Abnormal   IMPRESSION:   1. T12 vertebral body fracture extending to the right T11 facet and   T11 bilateral lamina. No retropulsed fragments into the spinal canal.   2. No fracture or traumatic subluxation of the lumbar spine.   Multilevel lumbar spondylosis. No high-grade canal stenosis.   3. See same day/same time as CT of the chest, abdomen and pelvis for   those images and report.      [Urgent Result: Thoracic vertebral body fracture]      Finding was identified on 7/27/2023 8:07 AM.       Dr. Sharma was contacted by Dr. Donato at 7/27/2023 8:22 AM and   verbalized understanding of the urgent finding.       CT Cervical Spine w/o Contrast   Final Result   IMPRESSION:    1. No fracture identified.   2. Degenerative/incidental findings as detailed.      THONY HILTON MD            SYSTEM ID:  [id number]      Thoracic spine MRI w/o contrast    (Results Pending)      Report per radiology    Laboratory:  Labs Ordered and Resulted from Time of ED Arrival to Time of ED Departure   COMPREHENSIVE METABOLIC PANEL - Abnormal       Result Value    Sodium 135 (*)     Potassium 4.2      Chloride 102      Carbon Dioxide (CO2) 18 (*)     Anion Gap 15      Urea Nitrogen 19.9      Creatinine 1.11      Calcium 8.8      Glucose 189 (*)     Alkaline Phosphatase 95      AST 11      ALT 14      Protein Total 7.3      Albumin 4.1      Bilirubin Total 0.6      GFR Estimate 75     CBC WITH PLATELETS AND DIFFERENTIAL - Abnormal    WBC Count 9.9      RBC Count 4.08 (*)     Hemoglobin 12.4 (*)     Hematocrit 38.0 (*)     MCV 93      MCH 30.4      MCHC 32.6      RDW 14.6      Platelet Count 197      % Neutrophils 71      % Lymphocytes 11      % Monocytes 14      %  Eosinophils 4      % Basophils 0      % Immature Granulocytes 0      NRBCs per 100 WBC 0      Absolute Neutrophils 6.9      Absolute Lymphocytes 1.1      Absolute Monocytes 1.4 (*)     Absolute Eosinophils 0.4      Absolute Basophils 0.0      Absolute Immature Granulocytes 0.0      Absolute NRBCs 0.0     TROPONIN T, HIGH SENSITIVITY - Normal    Troponin T, High Sensitivity 18     TROPONIN T, HIGH SENSITIVITY      Emergency Department Course & Assessments:     Interventions:  Medications   HYDROmorphone (DILAUDID) injection 1 mg (1 mg Intravenous $Given 7/27/23 0656)   iopamidol (ISOVUE-370) solution 135 mL (135 mLs Intravenous $Given 7/27/23 0729)   Saline (80 mLs Intravenous $Given 7/27/23 0731)      Assessments:  0632 Initial Examination  0842 Recheck and discussed results  0917 Recheck and discussed plan of care    Independent Interpretation (X-rays, CTs, rhythm strip):  None    Consultations/Discussion of Management or Tests:  0853 I discussed the patient with Roula Cabrera NP Neurosurgery.   0948 I discussed the patient with Dr. Green, Hospitalist.     Social Determinants of Health affecting care:   None    Disposition:  The patient was admitted to the hospital under the care of Dr. Green.     Impression & Plan      Medical Decision Making:  Vargas Dent is a 62 year old male who is afebrile and hemodynamically stable.  He has tenderness of the thoracic spine.  He is some chronic left-sided leg weakness which is chronic for him after CVA but no new neurologic changes.  Unfortunately, imaging revealed a T12 fracture through the body extending to T11.  I discussed with neurosurgery recommends admission, MRI, and likely TLSO bracing.  I discussed this with the patient and he is in agreement.  He is made bedrest at this time.  He did have an episode of chest pain earlier that resolved.  His initial EKG is without ischemic changes.  Muscle troponin is within normal limits I have ordered a repeat delta  troponin.  Discussed the patient with hospital service accepts for admission and he was in stable condition awaiting transfer to the floor.    Diagnosis:    ICD-10-CM    1. Closed fracture of twelfth thoracic vertebra, unspecified fracture morphology, initial encounter (H)  S22.089A       2. Closed fracture of eleventh thoracic vertebra, unspecified fracture morphology, initial encounter (H)  S22.089A       3. Chest pain, unspecified type  R07.9       4. Fall, initial encounter  W19.XXXA            Scribe Disclosure:  I, You Blood, am serving as a scribe at 6:38 AM on 7/27/2023 to document services personally performed by Royal Sharma MD based on my observations and the provider's statements to me.   7/27/2023   Royal Sharma MD Salay, Nicholas J, MD  07/27/23 1371

## 2023-07-27 NOTE — H&P
[facility]  History and Physical   Hospitalist  lAex Green MD       Vargas Dent MRN# [id number]   YOB: 1960 Age: 62 year old      Date of Admission:  7/27/2023         Assessment and Plan:   Vargas Dent is a 62 year old male with PMH significant for morbid obesity, diabetes, hypertension, dyslipidemia, GERD, h/o CVA (2014, residual left hemiparesis), DDD, chronic right shoulder pain, sleep apnea (on CPAP) presented to ED with mechanical fall two days PTA with back pain, noted acute T 12, T 11 fracture, admitted observation 7/27/23.    Mechanical fall with acute back pain  acute nondisplaced T 12 and T 11 vertebral body fracture  -will admit as observation  -CT thoracolumbar spine noted with T 12 vertebral body fracture extending to write T11 facet and T 11 b/l lamina, no fracture or traumatic subluxation of lumbar spine  -CT chest/abdomen/pelvis was unremarkable for any traumatic injury  -CT cervical spine noted no fracture but did note multilevel degenerative disc disease with moderate to severe spinal canal stenosis and sever foraminal stenosis bilaterally  -neurosurgery contacted from ED and suggested for MRI, TLSO brace; Orthotist consulted  -MRI ordered from ED, pending  -will schedule Tylenol 975 mg PO TID; lidocaine patch; PRN oxycodone on Dilaudid  -will have him on strict bed rest until neurosurgery evaluation and brace application  -will get PT evaluation,  for disposition planning    Diabetes mellitus  -will hold off on PTA metformin  -sliding scale insulin, hypoglycemia protocol    Hypertension  Dyslipidemia  h/o CVA (2014, residual left hemiparesis)  -will resume PTA amlodipine, Coreg, diltiazem, hydrochlorothiazide, losartan when verified by pharmacy  -will have hydralazine PRN for SBP> 180  -resume PTA aspirin when verified  -can hold off on Lipitor while under observation status  -PT evaluation as above    GERD: continue PTA  "Protonix    Sleep apnea: will continue CPAP at home settings      Clinically Significant Risk Factors Present on Admission                # Drug Induced Platelet Defect: home medication list includes an antiplatelet medication   # Hypertension: Noted on problem list      # Severe Obesity: Estimated body mass index is 43.85 kg/m  as calculated from the following:    Height as of this encounter: 1.702 m (5' 7\").    Weight as of this encounter: 127 kg (280 lb).               DVT prophylaxis: mechanical with PCD boots  Code status: full code    Care plan discussed with ED provider and patient.           Primary Care Physician:   Tabitha Montoya [phone]         Chief Complaint:     Back pain    History is obtained from the patient         History of Present Illness:     Vargas Dent is a 62 year old male with PMH significant for morbid obesity, diabetes, hypertension, dyslipidemia, GERD, h/o CVA (2014, residual left hemiparesis), DDD, chronic right shoulder pain, sleep apnea (on CPAP) presented to ED with mechanical fall two days PTA with back pain, noted acute T 12, T 11 fracture, admitted observation 7/27/23.    Due to his history of CVA he reports he has some balance issues. Two days ago while working in garage he stumbled, tripped and fell onto garage cabinet thus hyper extending his back and fell to floor. He started having admitted back pain but had been manageable. However he reports difficulty sleeping with worsening back pain when lying flat. He reports no difficulty with ambulation. His pain got worse and thus presented to ED for further evaluation. In the ED was seen by Dr. Sharma. Initial workup noted CT thoracolumbar spine with T 12 vertebral body fracture extending to write T11 facet and T 11 b/l lamina, no fracture or traumatic subluxation of lumbar spine  -neurosurgery contacted from ED and suggested for MRI, TLSO brace; Orthotist consulted  -MRI ordered from ED, pending    Hospitalist was requested " admission for further evaluation.    The patient denies any fever, chills, rigors, chest pain or shortness of breath.  Denies pain abdomen.  No bowel or bladder disturbances.           Past Medical History:     morbid obesity  Diabetes  Hypertension  Dyslipidemia  GERD  h/o CVA (2014, residual left hemiparesis)  DDD  chronic right shoulder pain  sleep apnea (on CPAP)           Past Surgical History:   No past surgical history on file.           Home Medications:     Prior to Admission Medications   Prescriptions Last Dose Informant Patient Reported? Taking?   ACETAMINOPHEN PO   Yes No   Sig: Take 650 mg by mouth every 4 hours as needed for pain   ATORVASTATIN CALCIUM PO   Yes No   Sig: Take 80 mg by mouth At Bedtime   CARVEDILOL PO   Yes No   Sig: Take 12.5 mg by mouth 2 times daily (with meals)   GABAPENTIN PO   Yes No   Sig: Take 400 mg by mouth 3 times daily   LOSARTAN POTASSIUM PO   Yes No   Sig: Take 100 mg by mouth daily   Magnesium 500 MG CAPS   Yes No   Sig: Take 1 capsule by mouth   NAPROXEN PO   Yes No   Sig: Take 500 mg by mouth 2 times daily as needed    ONETOUCH DELICA LANCETS 33G MISC   Yes No   Sig: Dispense lancets to fit device. Test 2 times per day ** Pharmacy allowed to substitute per patient's insurance.   OXYCODONE HCL PO   Yes No   Sig: Take 5-10 mg by mouth every 4 hours as needed 5mg for pain 1-5/10; 10mg for 6-10/10   PANTOPRAZOLE SODIUM PO   Yes No   Sig: Take 40 mg by mouth daily   albuterol (PROAIR HFA/PROVENTIL HFA/VENTOLIN HFA) 108 (90 Base) MCG/ACT inhaler   Yes No   Sig: Inhale 2 puffs into the lungs   alum & mag hydroxide-simethicone (MYLANTA/MAALOX) 200-200-20 MG/5ML SUSP   Yes No   Sig: Take 30 mLs by mouth every 4 hours as needed for indigestion   amLODIPine (NORVASC) 5 MG tablet   Yes No   Sig: Take 5 mg by mouth daily   aspirin 81 MG tablet   Yes No   Sig: Take 81 mg by mouth daily   blood glucose (NO BRAND SPECIFIED) test strip   Yes No   Sig: Dispense one touch ultra strips.  "Test 2 times per day ** Pharmacy allowed to substitute per Patients Insurance.   cetirizine (ZYRTEC) 10 MG tablet   Yes No   Sig: Take 10 mg by mouth   diltiazem ER (DILT-XR) 240 MG 24 hr ER beaded capsule   Yes No   Sig: Take 240 mg by mouth   fluticasone (FLONASE) 50 MCG/ACT nasal spray   Yes No   Sig: Spray 2 sprays into both nostrils At Bedtime    hydrochlorothiazide (HYDRODIURIL) 25 MG tablet   Yes No   Sig: Take 25 mg by mouth   ketotifen (ZADITOR/REFRESH ANTI-ITCH) 0.025 % ophthalmic solution   Yes No   Si drop   metFORMIN (GLUCOPHAGE-XR) 500 MG 24 hr tablet   Yes No   Sig: Take 2,000 mg by mouth   mometasone-formoterol (DULERA) 200-5 MCG/ACT inhaler   Yes No   Sig: Inhale 2 puffs into the lungs   montelukast (SINGULAIR) 10 MG tablet   Yes No   Sig: Take 10 mg by mouth   polyethylene glycol (MIRALAX/GLYCOLAX) powder   Yes No   Sig: Take 17 g by mouth daily   senna-docusate (SENOKOT-S;PERICOLACE) 8.6-50 MG per tablet   Yes No   Sig: Take 2 tablets by mouth At Bedtime      Facility-Administered Medications: None            Allergies:     Allergies   Allergen Reactions    Beta Adrenergic Blockers Other (See Comments)     Allergy injections            Social History:   Vargas Dent  reports that he has never smoked. He has never used smokeless tobacco.              Family History:   Vargas Dent family history is not on file.    Family history was reviewed by myself and not pertinent to current presentation.           Review of Systems:   A10 point Review of Systems was done and were negative other than noted in the HPI.             Physical Exam:   Blood pressure 117/84, pulse 78, temperature 98  F (36.7  C), temperature source Oral, resp. rate 18, height 1.702 m (5' 7\"), weight 127 kg (280 lb), SpO2 94 %.  280 lbs 0 oz        Constitutional: Alert, awake and oriented X 3; lying comfortably in bed in no apparent distress, morbidly obese   HEENT: Pupils equal and reactive to light and accomodation, EOMI " intact; neck supple no raised JVD or rigidity    Oral cavity: Moist mucosa   Cardiovascular: Normal s1 s2, regular rate and rhythm, no murmur   Lungs: B/l clear to auscultation, no wheezes or crepitations   Abdomen: Soft, nt, nd, no guarding, rigidity or rebound; BS +   LE : No edema   Musculoskeletal: Power 5/5 in all extremities; slight tenderness low back   Neuro: No focal neurological deficits noted, CN II to XII grossly intact   Psychiatry: normal mood and affect  Skin: No obvious skin rashes or ulcers             Data:   All new lab and imaging data was reviewed in Epic.   Significant labs and imagings include:    CBC and CMP are mostly unremarkable except glucose 189, normal troponin 18  EKG normal sinus rhythm  CT chest abdomen pelvis:  IMPRESSION:  1.  Acute-appearing nondisplaced fractures at T11 and T12 vertebral  bodies. No other visible displaced fractures. However, this  examination is not specifically tailored for spine assessment. Refer  to CT of the spine for further details and other potential occult  fractures.  2.  No other specific acute traumatic abnormality identified.   3.  Cholelithiasis.  CT thoracolumbar spine:  IMPRESSION:  1. T12 vertebral body fracture extending to the right T11 facet and  T11 bilateral lamina. No retropulsed fragments into the spinal canal.  2. No fracture or traumatic subluxation of the lumbar spine.  Multilevel lumbar spondylosis. No high-grade canal stenosis.  3. See same day/same time as CT of the chest, abdomen and pelvis for  those images and report.  CT cervical spine:  IMPRESSION:   1. No fracture identified.  2. Degenerative/incidental findings as detailed.             Alex Green MD  Hospitalist

## 2023-07-27 NOTE — PROGRESS NOTES
Observation goals  PRIOR TO DISCHARGE        Comments:   -diagnostic tests and consults completed and resulted not met  -vital signs normal or at patient baseline met  -adequate pain control on oral analgesics not met  -returns to baseline functional status not met  -safe disposition plan has been identified not met  -evaluation and clearance by neurosurgery not met  Nurse to notify provider when observation goals have been met and patient is ready for discharge.

## 2023-07-27 NOTE — ED TRIAGE NOTES
Pt tripped fell on Tuesday and landed forward. Pt c/o pain almost all over. Concerned that he ruptured his bowel and being septic.     Triage Assessment       Row Name 07/27/23 0590       Triage Assessment (Adult)    Airway WDL WDL       Respiratory WDL    Respiratory WDL WDL       Skin Circulation/Temperature WDL    Skin Circulation/Temperature WDL WDL       Cardiac WDL    Cardiac WDL WDL       Peripheral/Neurovascular WDL    Peripheral Neurovascular WDL WDL       Cognitive/Neuro/Behavioral WDL    Cognitive/Neuro/Behavioral WDL WDL

## 2023-07-28 ENCOUNTER — TELEPHONE (OUTPATIENT)
Dept: NEUROSURGERY | Facility: CLINIC | Age: 63
End: 2023-07-28

## 2023-07-28 ENCOUNTER — APPOINTMENT (OUTPATIENT)
Dept: GENERAL RADIOLOGY | Facility: CLINIC | Age: 63
End: 2023-07-28
Attending: NURSE PRACTITIONER
Payer: COMMERCIAL

## 2023-07-28 ENCOUNTER — APPOINTMENT (OUTPATIENT)
Dept: PHYSICAL THERAPY | Facility: CLINIC | Age: 63
End: 2023-07-28
Attending: HOSPITALIST
Payer: COMMERCIAL

## 2023-07-28 PROCEDURE — 97116 GAIT TRAINING THERAPY: CPT | Mod: GP

## 2023-07-28 PROCEDURE — G0378 HOSPITAL OBSERVATION PER HR: HCPCS

## 2023-07-28 PROCEDURE — 999N000128 HC STATISTIC PERIPHERAL IV START W/O US GUIDANCE

## 2023-07-28 PROCEDURE — 250N000009 HC RX 250: Performed by: HOSPITALIST

## 2023-07-28 PROCEDURE — 999N000040 HC STATISTIC CONSULT NO CHARGE VASC ACCESS

## 2023-07-28 PROCEDURE — 250N000013 HC RX MED GY IP 250 OP 250 PS 637: Performed by: HOSPITALIST

## 2023-07-28 PROCEDURE — 97530 THERAPEUTIC ACTIVITIES: CPT | Mod: GP

## 2023-07-28 PROCEDURE — 250N000011 HC RX IP 250 OP 636: Mod: JZ | Performed by: HOSPITALIST

## 2023-07-28 PROCEDURE — 99233 SBSQ HOSP IP/OBS HIGH 50: CPT | Performed by: HOSPITALIST

## 2023-07-28 PROCEDURE — 97161 PT EVAL LOW COMPLEX 20 MIN: CPT | Mod: GP

## 2023-07-28 PROCEDURE — 72080 X-RAY EXAM THORACOLMB 2/> VW: CPT

## 2023-07-28 PROCEDURE — 96376 TX/PRO/DX INJ SAME DRUG ADON: CPT

## 2023-07-28 PROCEDURE — L0456 TLSO FLEX TRNK SJ-SS PRE CST: HCPCS

## 2023-07-28 RX ORDER — DILTIAZEM HYDROCHLORIDE 240 MG/1
240 CAPSULE, COATED, EXTENDED RELEASE ORAL EVERY EVENING
Status: DISCONTINUED | OUTPATIENT
Start: 2023-07-28 | End: 2023-07-30 | Stop reason: HOSPADM

## 2023-07-28 RX ORDER — ASPIRIN 325 MG
325 TABLET ORAL DAILY
Status: DISCONTINUED | OUTPATIENT
Start: 2023-07-28 | End: 2023-07-30 | Stop reason: HOSPADM

## 2023-07-28 RX ORDER — MONTELUKAST SODIUM 10 MG/1
10 TABLET ORAL EVERY MORNING
Status: DISCONTINUED | OUTPATIENT
Start: 2023-07-28 | End: 2023-07-30 | Stop reason: HOSPADM

## 2023-07-28 RX ORDER — OXYCODONE HYDROCHLORIDE 5 MG/1
5 TABLET ORAL EVERY 4 HOURS PRN
Status: DISCONTINUED | OUTPATIENT
Start: 2023-07-28 | End: 2023-07-30 | Stop reason: HOSPADM

## 2023-07-28 RX ORDER — LOSARTAN POTASSIUM 100 MG/1
100 TABLET ORAL DAILY
Status: DISCONTINUED | OUTPATIENT
Start: 2023-07-28 | End: 2023-07-30 | Stop reason: HOSPADM

## 2023-07-28 RX ORDER — PREGABALIN 150 MG/1
150 CAPSULE ORAL 3 TIMES DAILY
Status: DISCONTINUED | OUTPATIENT
Start: 2023-07-28 | End: 2023-07-30 | Stop reason: HOSPADM

## 2023-07-28 RX ORDER — FLUTICASONE FUROATE AND VILANTEROL 200; 25 UG/1; UG/1
1 POWDER RESPIRATORY (INHALATION) DAILY
Status: DISCONTINUED | OUTPATIENT
Start: 2023-07-28 | End: 2023-07-30 | Stop reason: HOSPADM

## 2023-07-28 RX ORDER — ACETAMINOPHEN 325 MG/1
650 TABLET ORAL EVERY 4 HOURS PRN
Status: DISCONTINUED | OUTPATIENT
Start: 2023-07-28 | End: 2023-07-30 | Stop reason: HOSPADM

## 2023-07-28 RX ORDER — CETIRIZINE HYDROCHLORIDE 10 MG/1
10 TABLET ORAL DAILY
Status: DISCONTINUED | OUTPATIENT
Start: 2023-07-28 | End: 2023-07-30 | Stop reason: HOSPADM

## 2023-07-28 RX ORDER — ALBUTEROL SULFATE 90 UG/1
2 AEROSOL, METERED RESPIRATORY (INHALATION) EVERY 6 HOURS PRN
Status: DISCONTINUED | OUTPATIENT
Start: 2023-07-28 | End: 2023-07-30 | Stop reason: HOSPADM

## 2023-07-28 RX ADMIN — OXYCODONE HYDROCHLORIDE 5 MG: 5 TABLET ORAL at 12:02

## 2023-07-28 RX ADMIN — ACETAMINOPHEN 975 MG: 325 TABLET, FILM COATED ORAL at 14:08

## 2023-07-28 RX ADMIN — CETIRIZINE HYDROCHLORIDE 10 MG: 10 TABLET, FILM COATED ORAL at 09:22

## 2023-07-28 RX ADMIN — LIDOCAINE 1 PATCH: 560 PATCH PERCUTANEOUS; TOPICAL; TRANSDERMAL at 08:43

## 2023-07-28 RX ADMIN — HYDROMORPHONE HYDROCHLORIDE 0.2 MG: 0.2 INJECTION, SOLUTION INTRAMUSCULAR; INTRAVENOUS; SUBCUTANEOUS at 08:46

## 2023-07-28 RX ADMIN — PREGABALIN 150 MG: 150 CAPSULE ORAL at 14:08

## 2023-07-28 RX ADMIN — UMECLIDINIUM 1 PUFF: 62.5 AEROSOL, POWDER ORAL at 09:22

## 2023-07-28 RX ADMIN — DILTIAZEM HYDROCHLORIDE 240 MG: 240 CAPSULE, COATED, EXTENDED RELEASE ORAL at 20:32

## 2023-07-28 RX ADMIN — LOSARTAN POTASSIUM 100 MG: 100 TABLET, FILM COATED ORAL at 08:44

## 2023-07-28 RX ADMIN — ACETAMINOPHEN 975 MG: 325 TABLET, FILM COATED ORAL at 20:31

## 2023-07-28 RX ADMIN — HYDROMORPHONE HYDROCHLORIDE 0.2 MG: 0.2 INJECTION, SOLUTION INTRAMUSCULAR; INTRAVENOUS; SUBCUTANEOUS at 23:15

## 2023-07-28 RX ADMIN — MONTELUKAST 10 MG: 10 TABLET, FILM COATED ORAL at 09:22

## 2023-07-28 RX ADMIN — ASPIRIN 325 MG ORAL TABLET 325 MG: 325 PILL ORAL at 08:44

## 2023-07-28 RX ADMIN — PREGABALIN 150 MG: 150 CAPSULE ORAL at 09:21

## 2023-07-28 RX ADMIN — SENNOSIDES AND DOCUSATE SODIUM 1 TABLET: 50; 8.6 TABLET ORAL at 08:44

## 2023-07-28 RX ADMIN — FLUTICASONE FUROATE AND VILANTEROL TRIFENATATE 1 PUFF: 200; 25 POWDER RESPIRATORY (INHALATION) at 11:58

## 2023-07-28 RX ADMIN — SENNOSIDES AND DOCUSATE SODIUM 2 TABLET: 50; 8.6 TABLET ORAL at 20:32

## 2023-07-28 RX ADMIN — PREGABALIN 150 MG: 150 CAPSULE ORAL at 20:32

## 2023-07-28 ASSESSMENT — ACTIVITIES OF DAILY LIVING (ADL)
DEPENDENT_IADLS:: INDEPENDENT
ADLS_ACUITY_SCORE: 33
ADLS_ACUITY_SCORE: 36
ADLS_ACUITY_SCORE: 33
ADLS_ACUITY_SCORE: 36
ADLS_ACUITY_SCORE: 33
ADLS_ACUITY_SCORE: 36
ADLS_ACUITY_SCORE: 36

## 2023-07-28 NOTE — PROGRESS NOTES
07/28/23 1109   Appointment Info   Signing Clinician's Name / Credentials (PT) Edison Mendoza DPT   Rehab Comments (PT) TLSO when OOB   Quick Adds   Quick Adds Certification   Living Environment   People in Home alone   Current Living Arrangements apartment   Home Accessibility no concerns  (curb step to complete)   Transportation Anticipated car, drives self   Living Environment Comments Reports that he lives alone in an apartment. No stairs to complete but has to step up a curb.   Self-Care   Usual Activity Tolerance good   Current Activity Tolerance moderate   Equipment Currently Used at Home none   Fall history within last six months yes   Number of times patient has fallen within last six months 3   Activity/Exercise/Self-Care Comment Reports independent w/ mobility and ADLs w/o AD   General Information   Onset of Illness/Injury or Date of Surgery 07/27/23   Referring Physician Alex Green MD   Patient/Family Therapy Goals Statement (PT) Return to home   Pertinent History of Current Problem (include personal factors and/or comorbidities that impact the POC) Vargas Dent is a 62 year old male with PMH significant for morbid obesity, diabetes, hypertension, dyslipidemia, GERD, h/o CVA (2014, residual left hemiparesis), DDD, chronic right shoulder pain, sleep apnea (on CPAP) presented to ED with mechanical fall two days PTA with back pain, noted acute T 12, T 11 fracture, admitted observation 7/27/23.   Existing Precautions/Restrictions fall;brace worn when out of bed;spinal   Cognition   Affect/Mental Status (Cognition) WFL   Orientation Status (Cognition) oriented x 4   Follows Commands (Cognition) WFL   Range of Motion (ROM)   ROM Comment Impaired d/t back pain and brace in place   Strength (Manual Muscle Testing)   Strength Comments Generalized weakness noted   Bed Mobility   Comment, (Bed Mobility) Supine to sit w/ log roll technique and Min A x1   Transfers   Comment, (Transfers) Sit to stand w/  FWW and SBA   Gait/Stairs (Locomotion)   Comment, (Gait/Stairs) 10 ft w/ FWW and SBA   Balance   Balance Comments No overt LOB noted   Clinical Impression   Criteria for Skilled Therapeutic Intervention Yes, treatment indicated   PT Diagnosis (PT) Impaired ambulation   Influenced by the following impairments Impaired strength, balance and activity tolerance   Functional limitations due to impairments Impaired ADLs, IADLs and functional mobility   Clinical Presentation (PT Evaluation Complexity) Stable/Uncomplicated   Clinical Presentation Rationale Clinical judgment   Clinical Decision Making (Complexity) low complexity   Planned Therapy Interventions (PT) balance training;bed mobility training;gait training;home exercise program;patient/family education;strengthening;ROM (range of motion);orthotic fitting/training;transfer training;progressive activity/exercise   Risk & Benefits of therapy have been explained evaluation/treatment results reviewed;care plan/treatment goals reviewed;risks/benefits reviewed;current/potential barriers reviewed;participants voiced agreement with care plan;participants included;patient   PT Total Evaluation Time   PT Eval, Low Complexity Minutes (07473) 10   Therapy Certification   Start of care date 07/28/23   Certification date from 07/28/23   Certification date to 08/02/23   Medical Diagnosis Compression fx   Physical Therapy Goals   PT Frequency 6x/week   PT Predicted Duration/Target Date for Goal Attainment 08/07/23   PT Goals Bed Mobility;Transfers;Gait   PT: Bed Mobility Independent;Supine to/from sit;Within precautions   PT: Transfers Independent;Sit to/from stand;Within precautions   PT: Gait Independent;Greater than 200 feet;Within precautions   Interventions   Interventions Quick Adds Gait Training;Therapeutic Activity   Therapeutic Activity   Therapeutic Activities: dynamic activities to improve functional performance Minutes (77345) 12   Symptoms Noted During/After Treatment  Increased pain   Treatment Detail/Skilled Intervention Pt supine in bed at start of session. Agreeable to PT. Pt unable to tolerate writer flattening bed all the way as his bed is at home d/t back pain. TLSO already on Pt at start of session. Educated on log roll technique. Initially unable to tolerate as Pt to close to R side of the bed. Reports that he log rolls to R d/t previous stroke effecting L side. Reports bed much bigger at home and would have an easier time log rolling at home. Able to slightly assist w/ scooting Pt to L but need assistance from writer. Once in L sidelying position able to push up into sitting position w/o assistance from provider. Sit to stand x1 during session w/ no AD and SBA. Pt needing Min A to assist w/ doffing TLSO brace at end of session. Educated on donning/doffing. Reverse log roll at end of session w/ SBA. Blood found on Pt's gown at end of session. Believed to have pulled out IV w/ log roll back into bed. Pressure applied to area w/ paper towels and RN called. Call light in place and bed alarm on at end of session.   Gait Training   Gait Training Minutes (09773) 12   Symptoms Noted During/After Treatment (Gait Training) increased pain   Treatment Detail/Skilled Intervention Pt ambulating ~50 ft w/ FWW and SBA. Frequently picking up FWW and holding in air to turn. No overt LOB noted. Fair bala noted. Then Pt ambulating ~175 ft w/ no AD and SBA. No overt LOB noted. Reports this feels like his baseline mobility. Reports mild hip pain w/ ambulation but minimal back pain.   PT Discharge Planning   PT Plan Practice donning/doffing TLSO brace, bed mobility, ambulation distance, balance   PT Discharge Recommendation (DC Rec) home with home care physical therapy   PT Rationale for DC Rec Pt below baseline mobility but moving well. Pt lives alone in an apartment. Typically independent w/o AD. Currently SBA w/ mobility w/o AD. Anticipate can DC to home once medically ready w/ HH PT to  improve upon functional mobility and independence.   PT Brief overview of current status Min A x1 bed mobility, SBA w/o AD for mobility   Total Session Time   Timed Code Treatment Minutes 24   Total Session Time (sum of timed and untimed services) 34   [facility]  OUTPATIENT PHYSICAL THERAPY EVALUATION  PLAN OF TREATMENT FOR OUTPATIENT REHABILITATION  (COMPLETE FOR INITIAL CLAIMS ONLY)  Patient's Last Name, First Name, M.I.  YOB: 1960  Vargas Dent                        Provider's Name  [facility] Medical Record No.  [id number]                             Onset Date:  07/27/23   Start of Care Date:  07/28/23   Type:     _X_PT   ___OT   ___SLP Medical Diagnosis:  Compression fx              PT Diagnosis:  Impaired ambulation Visits from SOC:  1     See note for plan of treatment, functional goals and certification details    I CERTIFY THE NEED FOR THESE SERVICES FURNISHED UNDER        THIS PLAN OF TREATMENT AND WHILE UNDER MY CARE     (Physician co-signature of this document indicates review and certification of the therapy plan).

## 2023-07-28 NOTE — PROGRESS NOTES
Observation goals  PRIOR TO DISCHARGE       Comments: -diagnostic tests and consults completed and resulted met  -vital signs normal or at patient baseline met  -adequate pain control on oral analgesics not met (pt states IV dilaudid is the only pain medication that has helped at all)  -returns to baseline functional status not met  -safe disposition plan has been identified not met  -evaluation and clearance by neurosurgery met (pending xray)

## 2023-07-28 NOTE — PROGRESS NOTES
[facility]    Neurosurgery  Daily Note    Assessment & Plan      Patient admitted on 7/27/2023 with back pain s/p fall. Radiographic findings include a T12 vertebral body fracture extending to the right T11 facet and T11 bilateral lamina     Plan:  -Wear brace when out of bed  -Upright and recumbent XR in brace  -Avoid excessive bending, lifting, twisting  -Outpatient follow up in 6 weeks with XR, NSGY will coordinate  -Pain control measures    NSGY will plan to sign off pending results of XR.      Essie Castellon    Interval History   Stable    Physical Exam   Temp: 98  F (36.7  C) Temp src: Oral BP: (!) 159/99 Pulse: 89   Resp: 18 SpO2: 96 % O2 Device: None (Room air)    Vitals:    07/27/23 0535   Weight: 127 kg (280 lb)     Vital Signs with Ranges  Temp:  [97.4  F (36.3  C)-99.1  F (37.3  C)] 98  F (36.7  C)  Pulse:  [65-89] 89  Resp:  [12-18] 18  BP: (117-159)/(71-99) 159/99  SpO2:  [93 %-96 %] 96 %  I/O last 3 completed shifts:  In: -   Out: 525 [Urine:525]    Alert and oriented.  Pt upright with brace on.  Moves all extremities equally. Has some LLE weakness at baseline with knee extension and hip flexion  Reflexes symmetrical.   Pain to palpation over lumbar spine  Sensation intact to light touch    Medications       acetaminophen  975 mg Oral Q8H    aspirin  325 mg Oral Daily    cetirizine  10 mg Oral Daily    diltiazem ER COATED BEADS  240 mg Oral QPM    fluticasone-vilanterol  1 puff Inhalation Daily    lidocaine  1 patch Transdermal Q24H    losartan  100 mg Oral Daily    montelukast  10 mg Oral QAM    pregabalin  150 mg Oral TID    senna-docusate  1 tablet Oral BID    Or    senna-docusate  2 tablet Oral BID    umeclidinium  1 puff Inhalation Daily       Plans discussed with Dr. Salcido who was in agreement with plans    [facility] Neurosurgery  [facility]  [address] [place] [address]  [address]  [place] [address]

## 2023-07-28 NOTE — PROGRESS NOTES
MD Notification    Notified Person: MD    Notified Person Name:Richelle    Notification Date/Time: 7/28@12:20am    Notification Interaction: paging    Purpose of Notification:FYI- med reconciliation done by pharm. Pt. need orders. Thank you.   Monika HATCH    Orders Received:    Comments:

## 2023-07-28 NOTE — CONSULTS
Care Management Initial Consult    General Information  Assessment completed with: PatientPrakash  Type of CM/SW Visit: Initial Assessment    Primary Care Provider verified and updated as needed: Yes   Readmission within the last 30 days: no previous admission in last 30 days      Reason for Consult: discharge planning  Advance Care Planning:            Communication Assessment  Patient's communication style: spoken language (English or Bilingual)    Hearing Difficulty or Deaf: no        Cognitive  Cognitive/Neuro/Behavioral: WDL  Level of Consciousness: alert  Arousal Level: opens eyes spontaneously  Orientation: oriented x 4  Mood/Behavior: calm, cooperative  Best Language: 0 - No aphasia  Speech: clear    Living Environment:   People in home: alone     Current living Arrangements: apartment      Able to return to prior arrangements: yes       Family/Social Support:  Care provided by: self, other (see comments)  Provides care for: no one, unable/limited ability to care for self  Marital Status: Single             Description of Support System:           Current Resources:   Patient receiving home care services: No     Community Resources: [facility], [facility], Other (see comment) (out patient PT)  Equipment currently used at home: none  Supplies currently used at home: Other (CPAP)    Employment/Financial:  Employment Status: disabled        Financial Concerns: No concerns identified           Does the patient's insurance plan have a 3 day qualifying hospital stay waiver?  No    Lifestyle & Psychosocial Needs:  Social Determinants of Health     Tobacco Use: Low Risk  (9/29/2019)    Patient History     Smoking Tobacco Use: Never     Smokeless Tobacco Use: Never     Passive Exposure: Not on file   Alcohol Use: Not on file   Financial Resource Strain: Not on file   Food Insecurity: Not on file   Transportation Needs: Not on file   Physical Activity: Not on file   Stress: Not on file   Social Connections: Not on file    Intimate Partner Violence: Not on file   Depression: Not on file   Housing Stability: Not on file       Functional Status:  Prior to admission patient needed assistance:   Dependent ADLs:: Independent  Dependent IADLs:: Independent       Mental Health Status:     Mental Health Management: Group Therapy, Medication 1x week Mental Health Counseling per patient    Chemical Dependency Status:      unknown          Values/Beliefs:  Spiritual, Cultural Beliefs, Hoahaoism Practices, Values that affect care: no               Additional Information:  Met with patient for home care recommendation. Patient is currently going to outpatient PT, Patient does have [place] insurance that would be a barrier to getting home care. Patient thought that someone coming to his house would be ok if an agency was found, otherwise he will continue with his outpatient.   Writer sent referral to [facility]. Will see if an agency will accept.    Tricia Lopez RN

## 2023-07-28 NOTE — PLAN OF CARE
Goal Outcome Evaluation:    Care Plan Summary Note: T11-T12 fractures  Orientation A&Ox4  Observation Goals (not met)  Activity Level: Ax1 with sitting up, SBA once standing.  Fall Risk: Y  Behavior & Aggression Tool Color: Green  Pain Management: mild to moderate back pain managed with prn IV dilaudid x1, prn oxycodone x1 and scheduled tylenol.   ABNL VS/O2: VSS on RA, CPAP  ABNL Lab/BG: Na 135,   Diet: Reg  Bowel/Bladder: Continent b/b  Drains/Devices: L wrist PIV SL  Tests/Procedures for next shift: SW consult. Xray complete - neurosurg planning to sign off pending result.  Anticipated DC date: Pending xray results  Other Important Info: TSLO brace when OOB.

## 2023-07-28 NOTE — PROGRESS NOTES
[facility]  Hospitalist Progress Note        Alex Green MD   07/28/2023        Interval History:        - MRI 7/27 redemonstrated three column T12 vertebral body and T11 posterior element fractures. No cord contusion.  - patient refused schedule Tylenol, has been taking oxycodone and Flexeril PRN  - to start lidocaine patch 7/28  - Reports pain controlled but still using some IV Dilaudid; will increase PO oxycodone to 5 mg Q4 hours PRN  - Evaluated by neurosurgery, Orthotics consulted for TLSO brace, recumbent and upright XR in brace  - PT evaluated and recommend home with home care PT; to use TLSO brace when OOB; will have  for disposition planning         Assessment and Plan:        Vargas Dent is a 62 year old male with PMH significant for morbid obesity, diabetes, hypertension, dyslipidemia, GERD, h/o CVA (2014, residual left hemiparesis), DDD, chronic right shoulder pain, sleep apnea (on CPAP) presented to ED with mechanical fall two days PTA with back pain, noted acute T 12, T 11 fracture, admitted observation 7/27/23.     Mechanical fall with acute back pain  acute nondisplaced T 12 and T 11 vertebral body fracture  -CT thoracolumbar spine noted with T 12 vertebral body fracture extending to write T11 facet and T 11 b/l lamina, no fracture or traumatic subluxation of lumbar spine  -CT chest/abdomen/pelvis was unremarkable for any traumatic injury  -CT cervical spine noted no fracture but did note multilevel degenerative disc disease with moderate to severe spinal canal stenosis and sever foraminal stenosis bilaterally  - MRI 7/27 redemonstrated three column T12 vertebral body and T11 posterior element fractures. No cord contusion.  - patient refused scheduled Tylenol, has been taking oxycodone and Flexeril PRN; to start lidocaine patch 7/28  - Reports pain controlled but still using some IV Dilaudid; will increase PO oxycodone to 5 mg Q4 hours PRN; will also resume  "PTA Lyrica TID  - Evaluated by neurosurgery, Orthotics consulted for TLSO brace, recumbent and upright XR in brace  - PT evaluated and recommend home with home care PT; to use TLSO brace when OOB; will have  for disposition planning     Diabetes mellitus  -will hold off on PTA metformin  -sliding scale insulin, hypoglycemia protocol     Hypertension  Dyslipidemia (not on statin PTA)  h/o CVA (2014, residual left hemiparesis)  -will continue PTA diltiazem and losartan   -will have hydralazine PRN for SBP> 180  -continue PTA aspirin   -PT evaluation as above     GERD: continue PTA Protonix     Sleep apnea: continue CPAP at home settings    Diet: Regular Diet Adult      DVT Prophylaxis: mechanical with PCD boots    Code status: full code    Disposition:   -likely 7/29/23 with home care PT if cleared by neurosurgery  - consulted for disposition planning    Clinically Significant Risk Factors Present on Admission                # Drug Induced Platelet Defect: home medication list includes an antiplatelet medication   # Hypertension: Noted on problem list      # Severe Obesity: Estimated body mass index is 43.85 kg/m  as calculated from the following:    Height as of this encounter: 1.702 m (5' 7\").    Weight as of this encounter: 127 kg (280 lb).               Page Me (7 am to 6 pm)    Care plan discussed with patient and nursing              Physical Exam:      Blood pressure (!) (P) 147/89, pulse (P) 70, temperature (P) 97.4  F (36.3  C), temperature source (P) Oral, resp. rate (P) 18, height 1.702 m (5' 7\"), weight 127 kg (280 lb), SpO2 (P) 95 %.  Vitals:    07/27/23 0535   Weight: 127 kg (280 lb)     Vital Signs with Ranges  Temp:  [97.4  F (36.3  C)-99.1  F (37.3  C)] (P) 97.4  F (36.3  C)  Pulse:  [65-80] (P) 70  Resp:  [12-18] (P) 18  BP: (117-141)/(71-91) (P) 147/89  SpO2:  [93 %-95 %] (P) 95 %  I/O's Last 24 hours  I/O last 3 completed shifts:  In: -   Out: 525 " [Urine:525]    Constitutional: Alert, awake and oriented X 3; resting comfortably in no apparent distress       Oral cavity: Moist mucosa   Cardiovascular: Normal s1 s2, regular rate and rhythm, no murmur   Lungs: B/l clear to auscultation, no wheezes or crepitations   Abdomen: Soft, nt, nd, no guarding, rigidity or rebound; BS +   LE : No edema   Musculoskeletal/Neuro Power 5/5 in all extremities; No focal neurological deficits noted   Psychiatry: normal mood and affect  TLSO brace in place                Medications:         acetaminophen  975 mg Oral Q8H    aspirin  325 mg Oral Daily    diltiazem ER COATED BEADS  240 mg Oral QPM    lidocaine  1 patch Transdermal Q24H    losartan  100 mg Oral Daily    senna-docusate  1 tablet Oral BID    Or    senna-docusate  2 tablet Oral BID    umeclidinium  1 puff Inhalation Daily     PRN Meds: albuterol, bisacodyl, cyclobenzaprine, hydrALAZINE, HYDROmorphone, melatonin, naloxone **OR** naloxone **OR** naloxone **OR** naloxone, ondansetron **OR** ondansetron, oxyCODONE IR         Data:      All new lab and imaging data was reviewed.   Recent Labs   Lab Test 07/27/23  0653 11/06/18  0930   WBC 9.9 9.6   HGB 12.4* 10.4*   MCV 93 97    313      Recent Labs   Lab Test 07/27/23  0653 11/19/18  1450 11/16/18  1150   * 137 135*   POTASSIUM 4.2 4.5 4.7   CHLORIDE 102 101 101   CO2 18* 28 24   BUN 19.9 19 32*   CR 1.11 0.89 1.24   ANIONGAP 15 8 10   HARIKA 8.8 9.9 10.0   * 182* 196*     No lab results found.    Invalid input(s): TROP, TROPONINIES

## 2023-07-28 NOTE — PROGRESS NOTES
Orientation/Cognitive: A&O X4  Observation Goals (Met/ Not Met): not met  Mobility Level/Assist Equipment: A1 GB  Fall Risk (Y/N): Y  Behavior Concerns: green  Pain Management: Scheduled tylenol X1   Tele/VS/O2: no tele, VSS, O2 RA  ABNL Lab/BG: None  Diet: Reg  Bowel/Bladder: Continent  Skin Concerns: Blanchable redness on coccyx    Drains/Devices: PIV SL  Tests/Procedures for next shift: Neuro sign off Xray  Anticipated DC date & active delays: TBD pending neuro sign off

## 2023-07-28 NOTE — PROGRESS NOTES
Observation goals  PRIOR TO DISCHARGE        Comments:   -diagnostic tests and consults completed and resulted  Not met  -vital signs normal or at patient baseline  Met  -adequate pain control on oral analgesics  Met  -returns to baseline functional status  Not met  -safe disposition plan has been identified  Met  -evaluation and clearance by neurosurgery  Not met  Nurse to notify provider when observation goals have been met and patient is ready for discharge.

## 2023-07-28 NOTE — PROGRESS NOTES
Orientation/Cognitive: A&O  Observation Goals (Met/ Not Met): Not Met  Mobility Level/Assist Equipment: Bedrest, Got up to the BR With A1 with GBW  Fall Risk (Y/N): Yes  Behavior Concerns: NO  Pain Management: Oxycodone, Refused scheduled Tylenol   Tele/VS/O2: VSS On RA.Cpap  ABNL Lab/BG: None   Diet: Reg  Bowel/Bladder: Continent  Skin Concerns: Scattered bruises, Small abrasion R Hip open air.  Drains/Devices: None  Tests/Procedures for next shift: Care management, social work,orthosis brace, PT Consult  Anticipated DC date & active delays: TBD  Patient Stated Goal for Today: Pain Control.    Observation goals  PRIOR TO DISCHARGE        Comments: -diagnostic tests and consults completed and resulted: Not Met  -vital signs normal or at patient baseline: Not Met  -adequate pain control on oral analgesics : Not Met  -returns to baseline functional status:Not Met  -safe disposition plan has been identified : Not Met  -evaluation and clearance by neurosurgery: Not Met  Nurse to notify provider when observation goals have been met and patient is ready for discharge.

## 2023-07-28 NOTE — PROGRESS NOTES
Observation goals  PRIOR TO DISCHARGE        Comments: -diagnostic tests and consults completed and resulted: Not Met  -vital signs normal or at patient baseline: Not Met  -adequate pain control on oral analgesics : Not Met  -returns to baseline functional status:Not Met  -safe disposition plan has been identified : Not Met  -evaluation and clearance by neurosurgery: Not Met  Nurse to notify provider when observation goals have been met and patient is ready for discharge.

## 2023-07-29 ENCOUNTER — APPOINTMENT (OUTPATIENT)
Dept: PHYSICAL THERAPY | Facility: CLINIC | Age: 63
End: 2023-07-29
Payer: COMMERCIAL

## 2023-07-29 PROCEDURE — 99232 SBSQ HOSP IP/OBS MODERATE 35: CPT | Performed by: HOSPITALIST

## 2023-07-29 PROCEDURE — 250N000009 HC RX 250: Performed by: HOSPITALIST

## 2023-07-29 PROCEDURE — G0378 HOSPITAL OBSERVATION PER HR: HCPCS

## 2023-07-29 PROCEDURE — 250N000013 HC RX MED GY IP 250 OP 250 PS 637: Performed by: HOSPITALIST

## 2023-07-29 PROCEDURE — 97116 GAIT TRAINING THERAPY: CPT | Mod: GP

## 2023-07-29 PROCEDURE — 250N000011 HC RX IP 250 OP 636: Mod: JZ | Performed by: HOSPITALIST

## 2023-07-29 PROCEDURE — 97530 THERAPEUTIC ACTIVITIES: CPT | Mod: GP

## 2023-07-29 PROCEDURE — 96376 TX/PRO/DX INJ SAME DRUG ADON: CPT

## 2023-07-29 RX ORDER — OXYCODONE HYDROCHLORIDE 5 MG/1
5 TABLET ORAL EVERY 6 HOURS PRN
Qty: 15 TABLET | Refills: 0 | Status: SHIPPED | OUTPATIENT
Start: 2023-07-29

## 2023-07-29 RX ADMIN — PREGABALIN 150 MG: 150 CAPSULE ORAL at 08:08

## 2023-07-29 RX ADMIN — SENNOSIDES AND DOCUSATE SODIUM 2 TABLET: 50; 8.6 TABLET ORAL at 20:43

## 2023-07-29 RX ADMIN — MONTELUKAST 10 MG: 10 TABLET, FILM COATED ORAL at 08:09

## 2023-07-29 RX ADMIN — ASPIRIN 325 MG ORAL TABLET 325 MG: 325 PILL ORAL at 08:08

## 2023-07-29 RX ADMIN — PREGABALIN 150 MG: 150 CAPSULE ORAL at 14:32

## 2023-07-29 RX ADMIN — ACETAMINOPHEN 975 MG: 325 TABLET, FILM COATED ORAL at 04:14

## 2023-07-29 RX ADMIN — UMECLIDINIUM 1 PUFF: 62.5 AEROSOL, POWDER ORAL at 11:51

## 2023-07-29 RX ADMIN — DILTIAZEM HYDROCHLORIDE 240 MG: 240 CAPSULE, COATED, EXTENDED RELEASE ORAL at 20:43

## 2023-07-29 RX ADMIN — FLUTICASONE FUROATE AND VILANTEROL TRIFENATATE 1 PUFF: 200; 25 POWDER RESPIRATORY (INHALATION) at 11:51

## 2023-07-29 RX ADMIN — LOSARTAN POTASSIUM 100 MG: 100 TABLET, FILM COATED ORAL at 08:08

## 2023-07-29 RX ADMIN — HYDROMORPHONE HYDROCHLORIDE 0.2 MG: 0.2 INJECTION, SOLUTION INTRAMUSCULAR; INTRAVENOUS; SUBCUTANEOUS at 08:19

## 2023-07-29 RX ADMIN — ACETAMINOPHEN 975 MG: 325 TABLET, FILM COATED ORAL at 14:32

## 2023-07-29 RX ADMIN — SENNOSIDES AND DOCUSATE SODIUM 2 TABLET: 50; 8.6 TABLET ORAL at 08:08

## 2023-07-29 RX ADMIN — CETIRIZINE HYDROCHLORIDE 10 MG: 10 TABLET, FILM COATED ORAL at 08:08

## 2023-07-29 RX ADMIN — ACETAMINOPHEN 975 MG: 325 TABLET, FILM COATED ORAL at 20:43

## 2023-07-29 RX ADMIN — LIDOCAINE 1 PATCH: 560 PATCH PERCUTANEOUS; TOPICAL; TRANSDERMAL at 08:07

## 2023-07-29 RX ADMIN — PREGABALIN 150 MG: 150 CAPSULE ORAL at 20:43

## 2023-07-29 RX ADMIN — BISACODYL 10 MG: 10 SUPPOSITORY RECTAL at 06:51

## 2023-07-29 ASSESSMENT — ACTIVITIES OF DAILY LIVING (ADL)
ADLS_ACUITY_SCORE: 36
ADLS_ACUITY_SCORE: 36
ADLS_ACUITY_SCORE: 35
ADLS_ACUITY_SCORE: 35
ADLS_ACUITY_SCORE: 36
ADLS_ACUITY_SCORE: 35
ADLS_ACUITY_SCORE: 36
ADLS_ACUITY_SCORE: 35
ADLS_ACUITY_SCORE: 36
ADLS_ACUITY_SCORE: 35
ADLS_ACUITY_SCORE: 35
ADLS_ACUITY_SCORE: 36

## 2023-07-29 NOTE — UTILIZATION REVIEW
"[facility]     Admission Status; Secondary Review Determination     Admission Date: 7/27/2023  5:32 AM      Under the authority of the Utilization Management Committee, the utilization review process indicated a secondary review on the above patient.  The review outcome is based on review of the medical records, discussions with staff, and applying clinical experience noted on the date of the review.          (x) Observation Status Appropriate - This patient does not meet hospital inpatient criteria and is placed in observation status. If this patient's primary payer is Medicare and was admitted as an inpatient, Condition Code 44 should be used and patient status changed to \"observation\".     RATIONALE FOR DETERMINATION   62-year-old male with a history of obesity, diabetes, hypertension, stroke and sleep apnea was admitted on 7/27 with back pain.  Imaging showed evidence of an acute nondisplaced T12 and T11 vertebral body fracture.  Neurosurgery was consulted.  No surgical intervention is required.  He was fitted for a TLSO brace.  Analgesics and a bowel regimen have been ordered.  He is working with physical and Occupational Therapy.  Care coordinator is helping with obtaining home care services.  Discharge is anticipated in the next 1 to 2 days after these arrangements have been made.  Patient appears clinically stable and showing gradual improvement.  At the time of this review there is no medical necessity for inpatient hospitalization and observation status is recommended.    The severity of illness, intensity of service provided, expected LOS and risk for adverse outcome make the care appropriate for further observation; however, doesn't meet criteria for hospital inpatient admission.          The information on this document is developed by the utilization review team in order for the business office to ensure compliance.  This only denotes the appropriateness of proper admission status and " does not reflect the quality of care rendered.         The definitions of Inpatient Status and Observation Status used in making the determination above are those provided in the CMS Coverage Manual, Chapter 1 and Chapter 6, section 70.4.      Sincerely,     Marek Summers DO MPH   Physician Advisor  Utilization Review  [facility]

## 2023-07-29 NOTE — PROGRESS NOTES
Observation goals  PRIOR TO DISCHARGE        -diagnostic tests and consults completed and resulted-Met.  -vital signs normal or at patient baseline-Met.  -adequate pain control on oral analgesics-Met.  -returns to baseline functional status-not met.  -safe disposition plan has been identified-not met.  -evaluation and clearance by neurosurgery-not met.  Nurse to notify provider when observation goals have been met and patient is ready for discharge.

## 2023-07-29 NOTE — PROGRESS NOTES
Observation goals  PRIOR TO DISCHARGE        Comments:   -diagnostic tests and consults completed and resulted  Not met  -vital signs normal or at patient baseline  Met  -adequate pain control on oral analgesics  Met  -returns to baseline functional status  Not met  -safe disposition plan has been identified  Not Met  -evaluation and clearance by neurosurgery  Met  Nurse to notify provider when observation goals have been met and patient is ready for discharge.

## 2023-07-29 NOTE — PLAN OF CARE
Summary:    Care Plan Summary Note:  Orientation: A&OX4  Observation Goals (met & not met): Not Met  Activity Level: A1/GB/W  Fall Risk: Y  Behavior & Aggression Tool Color: Green  Pain Management: PRN Diladed X1  ABNL VS/O2: VSS/RA  ABNL Lab/BG: See chart  Diet: Regular  Bowel/Bladder: Continent  Drains/Devices: PIV/Infusing  Tests/Procedures for next shift: None  Anticipated DC date: TBD    Patient complaining constipation, Prn Dulcolax given    Observation goals  PRIOR TO DISCHARGE       Comments:   -diagnostic tests and consults completed and resulted  Not met  -vital signs normal or at patient baseline  Met  -adequate pain control on oral analgesics  Met  -returns to baseline functional status  Not met  -safe disposition plan has been identified  Met  -evaluation and clearance by neurosurgery  Not met  Nurse to notify provider when observation goals have been met and patient is ready for discharge.

## 2023-07-29 NOTE — PROGRESS NOTES
"[facility]  Hospitalist Progress Note        Alex Green MD   07/29/2023        Interval History:        - Pain seems reasonably well controlled on current regimen; encouraged to use TLSO brace when out of bed  - Post TLSO brace x-ray (7/28): \"T12 vertebral body and T11 posterior element acute fractures seen on recent CT not visualized on current radiographs. Vertebral heights maintained. No significant subluxations. Bilateral sacroiliac joints intact. Multilevel spondylosis. \"  - PT rec home with home care PT; to use TLSO brace when OOB  - care coordinator assisting with arranging for home cares, per CC patient has [place] and that might be a barrier to getting home cares, patient does have outpatient PT         Assessment and Plan:        Vargas Dent is a 62 year old male with PMH significant for morbid obesity, diabetes, hypertension, dyslipidemia, GERD, h/o CVA (2014, residual left hemiparesis), DDD, chronic right shoulder pain, sleep apnea (on CPAP) presented to ED with mechanical fall two days PTA with back pain, noted acute T 12, T 11 fracture, admitted observation 7/27/23.     Mechanical fall with acute back pain  acute nondisplaced T 12 and T 11 vertebral body fracture  - CT thoracolumbar spine noted with T 12 vertebral body fracture extending to write T11 facet and T 11 b/l lamina, no fracture or traumatic subluxation of lumbar spine  - CT chest/abdomen/pelvis was unremarkable for any traumatic injury  - CT cervical spine noted no fracture but did note multilevel degenerative disc disease with moderate to severe spinal canal stenosis and sever foraminal stenosis bilaterally  - MRI 7/27 redemonstrated three column T12 vertebral body and T11 posterior element fractures. No cord contusion    - Pain seems reasonably well controlled on current regimen (Tylenol, oxycodone, Flexeril, lidocaine patch); continue PTA Lyrica TID  - Evaluated by neurosurgery, has a TLSO brace, " "encouraged to use brace when OOB  - Post TLSO brace x-ray (7/28): \"T12 vertebral body and T11 posterior element acute fractures seen on recent CT not visualized on current radiographs. Vertebral heights maintained. No significant subluxations. Bilateral sacroiliac joints intact. Multilevel spondylosis. \"  - PT rec home with home care PT    Constipation  -bowel regimen available, no luck with suppository; will order tap water enema PRN     Diabetes mellitus  -will hold off on PTA metformin  -sliding scale insulin, hypoglycemia protocol     Hypertension  Dyslipidemia (not on statin PTA)  h/o CVA (2014, residual left hemiparesis)  -continue PTA diltiazem and losartan   -hydralazine PRN for SBP> 180  -continue PTA aspirin   -PT evaluation as above     GERD: continue PTA Protonix     Sleep apnea: continue CPAP at home settings    Diet: Regular Diet Adult      DVT Prophylaxis: mechanical with PCD boots    Code status: full code    Disposition:   - likely 1-2 days pending home cares and neurosurgery clearance  - PT rec home with home care PT; to use TLSO brace when OOB  - care coordinator assisting with arranging for home cares, per CC patient [place] and that might be a barrier to getting home cares, patient does have outpatient PT  - if unable to get home cares, will plan to continue outpatient PT    Clinically Significant Risk Factors Present on Admission                  # Drug Induced Platelet Defect: home medication list includes an antiplatelet medication     # Hypertension: Noted on problem list   # Non-Invasive mechanical ventilation: current O2 Device: BiPAP/CPAP  # Acute hypoxic respiratory failure: continue supplemental O2 as needed     # Severe Obesity: Estimated body mass index is 43.85 kg/m  as calculated from the following:    Height as of this encounter: 1.702 m (5' 7\").    Weight as of this encounter: 127 kg (280 lb).                 Page Me (7 am to 6 pm)    Care plan discussed with patient and " "care coordinator              Physical Exam:      Blood pressure (!) 153/87, pulse 74, temperature 98  F (36.7  C), temperature source Oral, resp. rate 18, height 1.702 m (5' 7\"), weight 127 kg (280 lb), SpO2 93 %.  Vitals:    07/27/23 0535   Weight: 127 kg (280 lb)     Vital Signs with Ranges  Temp:  [98  F (36.7  C)-98.5  F (36.9  C)] 98  F (36.7  C)  Pulse:  [67-89] 74  Resp:  [18-20] 18  BP: (123-159)/(74-99) 153/87  SpO2:  [92 %-96 %] 93 %  I/O's Last 24 hours  I/O last 3 completed shifts:  In: 240 [P.O.:240]  Out: -     Constitutional: Alert, awake and oriented X 3; resting comfortably in no apparent distress; morbidly obese       Oral cavity: Moist mucosa   Cardiovascular: Normal s1 s2, regular rate and rhythm, no murmur   Lungs: B/l clear to auscultation, no wheezes or crepitations   Abdomen: Soft, nt, nd, no guarding, rigidity or rebound; BS +   LE : No edema   Musculoskeletal/Neuro Power 5/5 in all extremities; No focal neurological deficits noted   Psychiatry: normal mood and affect  TLSO brace in place                Medications:         acetaminophen  975 mg Oral Q8H    aspirin  325 mg Oral Daily    cetirizine  10 mg Oral Daily    diltiazem ER COATED BEADS  240 mg Oral QPM    fluticasone-vilanterol  1 puff Inhalation Daily    lidocaine  1 patch Transdermal Q24H    losartan  100 mg Oral Daily    montelukast  10 mg Oral QAM    pregabalin  150 mg Oral TID    senna-docusate  1 tablet Oral BID    Or    senna-docusate  2 tablet Oral BID    umeclidinium  1 puff Inhalation Daily     PRN Meds: acetaminophen, albuterol, bisacodyl, cyclobenzaprine, hydrALAZINE, HYDROmorphone, melatonin, naloxone **OR** naloxone **OR** naloxone **OR** naloxone, ondansetron **OR** ondansetron, oxyCODONE IR         Data:      All new lab and imaging data was reviewed.   Recent Labs   Lab Test 07/27/23  0653 11/06/18  0930   WBC 9.9 9.6   HGB 12.4* 10.4*   MCV 93 97    313        Recent Labs   Lab Test 07/27/23  0653 " 11/19/18  1450 11/16/18  1150   * 137 135*   POTASSIUM 4.2 4.5 4.7   CHLORIDE 102 101 101   CO2 18* 28 24   BUN 19.9 19 32*   CR 1.11 0.89 1.24   ANIONGAP 15 8 10   HARIKA 8.8 9.9 10.0   * 182* 196*       No lab results found.    Invalid input(s): TROP, TROPONINIES

## 2023-07-29 NOTE — PROGRESS NOTES
Orientation/Cognitive: A&O X4  Observation Goals (Met/ Not Met): not met  Mobility Level/Assist Equipment: A1 GB/W  Fall Risk (Y/N): Y  Behavior Concerns: green  Pain Management: Scheduled tylenol X1   Tele/VS/O2: no tele, VSS, O2 RA  ABNL Lab/BG: None  Diet: Reg  Bowel/Bladder: Continent  Skin Concerns: Blanchable redness on coccyx    Drains/Devices: PIV SL  Tests/Procedures for next shift: SW working on home care/ home Pt or Outpatient Pt  Anticipated DC date & active delays: TBD pending SW finding appropriate cares for patient in home.

## 2023-07-29 NOTE — PROGRESS NOTES
"Neurosurgery Progress     EXAM: XR THORACIC LUMBAR STANDING 2 VIEWS - 7/28/2023     T12 vertebral body and T11 posterior element acute fractures seen on recent CT not visualized on current radiographs.     Vertebral heights maintained. No significant subluxations. Bilateral sacroiliac joints intact.     Multilevel spondylosis.     Neuro stable.     TODAY'S PLAN:     -Wear brace when out of bed  -Upright and recumbent XR in brace - reviewed. Results above.   -Avoid excessive bending, lifting, twisting  -Outpatient follow up in 6 weeks with XR, NSGY will coordinate  -Pain control measures     NSGY will sign off .     In the event that patient's symptoms worsen or change we would appreciate being contacted. We did discuss signs of a worsening problem that he should seek being evaluated.     We did review the above information with the patient whom agrees with the plan and did verbalize understanding.   ________________________________________________________________     /72 (BP Location: Right arm)   Pulse 98   Temp (!) 100.8  F (38.2  C) (Oral)   Resp 16   Ht 1.702 m (5' 7\")   Wt 127 kg (280 lb)   SpO2 93%   BMI 43.85 kg/m       Pt in bed. Appears comfortable and in no apparent distress, moving all extremities.   CN II-XII intact, alert and appropriate with conversation and following commands.   Bilateral upper and lower extremities with appropriate strength. Sensation intact throughout. Acceptable ROM.   Calves soft and non-tender bilaterally.     All pertinent labs and updated imaging reviewed in EPIC.     Deepak Dodson PA-C   Neurosurgery   [phone] (P)       "

## 2023-07-29 NOTE — PLAN OF CARE
Goal Outcome Evaluation:      Plan of Care Reviewed With: patient    Overall Patient Progress: improvingOverall Patient Progress: improving  Orientation-AxOx4.    Vitals/Tele-VSS on room air, lungs clear, wears CPAP at night and naps. Pain rated 3-5/10 in abdomen and back.    IV Access/drains-SL IV.    Diet-Regular- poor appetite today, felt full and abdominal/back discomfort.    Mobility-assist of 1/walker.    GI/-Voids adequate/passing flatus/belching. Gave tap water enema with watery dark brown liquid stool returned.     Wound/Skin-intact, patience legs +1-2 edema, contusion on right knee. Bruising.    Consults-neurosurgery, PT.    Discharge Plan-pending  to get home PT set up  or as out patient.         Observation goals  PRIOR TO DISCHARGE        -diagnostic tests and consults completed and resulted-Met.  -vital signs normal or at patient baseline-Met.  -adequate pain control on oral analgesics-Met.  -returns to baseline functional status-not met.  -safe disposition plan has been identified-not met.  -evaluation and clearance by neurosurgery-Met.  Nurse to notify provider when observation goals have been met and patient is ready for discharge.                          See Flow sheets for assessment

## 2023-07-30 VITALS
DIASTOLIC BLOOD PRESSURE: 65 MMHG | HEIGHT: 67 IN | BODY MASS INDEX: 43.95 KG/M2 | HEART RATE: 86 BPM | WEIGHT: 280 LBS | RESPIRATION RATE: 16 BRPM | SYSTOLIC BLOOD PRESSURE: 114 MMHG | OXYGEN SATURATION: 92 % | TEMPERATURE: 97.4 F

## 2023-07-30 PROCEDURE — 250N000013 HC RX MED GY IP 250 OP 250 PS 637: Performed by: HOSPITALIST

## 2023-07-30 PROCEDURE — G0378 HOSPITAL OBSERVATION PER HR: HCPCS

## 2023-07-30 PROCEDURE — 99239 HOSP IP/OBS DSCHRG MGMT >30: CPT | Performed by: HOSPITALIST

## 2023-07-30 PROCEDURE — 250N000009 HC RX 250: Performed by: HOSPITALIST

## 2023-07-30 RX ORDER — CYCLOBENZAPRINE HCL 10 MG
10 TABLET ORAL EVERY 8 HOURS PRN
Qty: 10 TABLET | Refills: 0 | Status: SHIPPED | OUTPATIENT
Start: 2023-07-30

## 2023-07-30 RX ORDER — AMOXICILLIN 250 MG
1 CAPSULE ORAL 2 TIMES DAILY PRN
Qty: 20 TABLET | Refills: 0 | Status: SHIPPED | OUTPATIENT
Start: 2023-07-30

## 2023-07-30 RX ORDER — LIDOCAINE 4 G/G
1 PATCH TOPICAL EVERY 24 HOURS
Qty: 5 PATCH | Refills: 0 | Status: SHIPPED | OUTPATIENT
Start: 2023-07-30

## 2023-07-30 RX ADMIN — PREGABALIN 150 MG: 150 CAPSULE ORAL at 08:25

## 2023-07-30 RX ADMIN — MONTELUKAST 10 MG: 10 TABLET, FILM COATED ORAL at 08:25

## 2023-07-30 RX ADMIN — LOSARTAN POTASSIUM 100 MG: 100 TABLET, FILM COATED ORAL at 08:25

## 2023-07-30 RX ADMIN — LIDOCAINE 1 PATCH: 560 PATCH PERCUTANEOUS; TOPICAL; TRANSDERMAL at 08:24

## 2023-07-30 RX ADMIN — UMECLIDINIUM 1 PUFF: 62.5 AEROSOL, POWDER ORAL at 08:25

## 2023-07-30 RX ADMIN — FLUTICASONE FUROATE AND VILANTEROL TRIFENATATE 1 PUFF: 200; 25 POWDER RESPIRATORY (INHALATION) at 08:25

## 2023-07-30 RX ADMIN — CETIRIZINE HYDROCHLORIDE 10 MG: 10 TABLET, FILM COATED ORAL at 08:24

## 2023-07-30 RX ADMIN — SENNOSIDES AND DOCUSATE SODIUM 2 TABLET: 50; 8.6 TABLET ORAL at 08:24

## 2023-07-30 RX ADMIN — ACETAMINOPHEN 975 MG: 325 TABLET, FILM COATED ORAL at 05:06

## 2023-07-30 RX ADMIN — ASPIRIN 325 MG ORAL TABLET 325 MG: 325 PILL ORAL at 08:25

## 2023-07-30 ASSESSMENT — ACTIVITIES OF DAILY LIVING (ADL)
ADLS_ACUITY_SCORE: 35

## 2023-07-30 NOTE — PLAN OF CARE
Discharge Note    Patient discharged to home via private vehicle  accompanied by sister.  IV: Discontinued  Prescriptions filled and given to patient/family.   Belongings reviewed and sent with patient.   Home medications returned to patient: Yes  Equipment sent with: patient.   patient verbalizes understanding of discharge instructions. AVS given to patient.

## 2023-07-30 NOTE — PROGRESS NOTES
Patient seen and examined    -No acute issues overnight;  notes multiple declines for home cares  -discussed with patient who wants to go home with plans for outpatient PT (has an upcoming appointment)  - Neurosurgery signed off, suggested outpatient follow-up in 6 weeks with x-ray     Discharge home today with outpatient PT    Please see discharge summary for details

## 2023-07-30 NOTE — DISCHARGE SUMMARY
"[facility]  Discharge Summary        Vargas Dent MRN# [id number]   YOB: 1960 Age: 63 year old     Date of Admission:  7/27/2023  Date of Discharge:  7/30/2023  Admitting Physician:  Alex Green MD  Discharge Physician: Alex Green MD  Discharging Service: Hospitalist     Primary Provider: Tabitha Montoya  Primary Care Physician Phone Number: [phone]         Discharge Diagnoses/Problem Oriented Hospital Course (Providers):    Vargas Dent was admitted on 7/27/2023 by Alex Green MD and I would refer you to their history and physical.  The following problems were addressed during his hospitalization:    Vargas Dent is a 62 year old male with PMH significant for morbid obesity, diabetes, hypertension, dyslipidemia, GERD, h/o CVA (2014, residual left hemiparesis), DDD, chronic right shoulder pain, sleep apnea (on CPAP) presented to ED with mechanical fall two days PTA with back pain, noted acute T 12, T 11 fracture, admitted observation 7/27/23.     Mechanical fall with acute back pain  acute nondisplaced T 12 and T 11 vertebral body fracture  - CT thoracolumbar spine noted with T 12 vertebral body fracture extending to right T11 facet and T 11 b/l lamina, no fracture or traumatic subluxation of lumbar spine  - CT chest/abdomen/pelvis was unremarkable for any traumatic injury  - CT cervical spine noted no fracture but did note multilevel degenerative disc disease with moderate to severe spinal canal stenosis and sever foraminal stenosis bilaterally  - MRI 7/27 redemonstrated three column T12 vertebral body and T11 posterior element fractures. No cord contusion     - Pain reasonably well controlled on current regimen (Tylenol, oxycodone, Flexeril, lidocaine patch); continue PTA Lyrica TID  - Evaluated by neurosurgery, has a TLSO brace, encouraged to use brace when OOB  - Post TLSO brace x-ray (7/28): \"T12 vertebral body and T11 posterior element " "acute fractures seen on recent CT not visualized on current radiographs. Vertebral heights maintained. No significant subluxations. Bilateral sacroiliac joints intact. Multilevel spondylosis. \"  - Neurosurgery signed off, suggested outpatient follow-up in 6 weeks with x-ray   - PT rec home with home care PT;   assisted with disposition and noted multiple declines for home cares due to his insurance  - discussed with patient who wants to go home with plans for outpatient PT (has an upcoming appointment)     Constipation  - continue docu-senna BID prn      Diabetes mellitus  -resume PTA metformin on discharge     Hypertension  Dyslipidemia (not on statin PTA)  h/o CVA (2014, residual left hemiparesis)  -continue PTA diltiazem and losartan   -continue PTA aspirin   -continue outpatient PT     GERD: continue PTA Protonix     Sleep apnea: continue CPAP at home settings     Code status: full code     Clinically Significant Risk Factors Present on Admission                # Drug Induced Platelet Defect: home medication list includes an antiplatelet medication   # Hypertension: Noted on problem list      # Severe Obesity: Estimated body mass index is 43.85 kg/m  as calculated from the following:    Height as of this encounter: 1.702 m (5' 7\").    Weight as of this encounter: 127 kg (280 lb).                       Brief Hospital Stay Summary Sent Home With Patient in AVS:        Reason for your hospital stay      You were admitted for back pain due to vertebral body fracture. You have   been evaluated by neurosurgery and a TLSO brace ordered for out of bed   mobility.                Pending Results:        Unresulted Labs Ordered in the Past 30 Days of this Admission       No orders found from 6/27/2023 to 7/28/2023.              Discharge Instructions and Follow-Up:      Follow-up Appointments     Follow-up and recommended labs and tests       Your Neurosurgical follow up appointments have been recommended 6 " weeks   with XR prior. You may call [phone] to make, confirm or change your   follow-up Neurosurgery appointment dates and/or times.        Follow-up and recommended labs and tests       Follow up with primary care provider, Tabitha Montoya, within 7 days for   hospital follow- up.    Continue outpatient physical therapy.                Discharge Disposition:      Discharged to home        Discharge Medications:        Current Discharge Medication List        START taking these medications    Details   cyclobenzaprine (FLEXERIL) 10 MG tablet Take 1 tablet (10 mg) by mouth every 8 hours as needed for muscle spasms  Qty: 10 tablet, Refills: 0    Associated Diagnoses: Closed fracture of twelfth thoracic vertebra, unspecified fracture morphology, initial encounter (H)      Lidocaine (LIDOCARE) 4 % Patch Place 1 patch onto the skin every 24 hours To prevent lidocaine toxicity, patient should be patch free for 12 hrs daily.  Qty: 5 patch, Refills: 0    Comments: Future refills by PCP Dr. Tabitha Montoya with phone number [phone].  Associated Diagnoses: Closed fracture of twelfth thoracic vertebra, unspecified fracture morphology, initial encounter (H)      oxyCODONE (ROXICODONE) 5 MG tablet Take 1 tablet (5 mg) by mouth every 6 hours as needed for moderate pain (IF pain not managed with non-pharmacological and non-opioid interventions)  Qty: 15 tablet, Refills: 0    Associated Diagnoses: Closed fracture of twelfth thoracic vertebra, unspecified fracture morphology, initial encounter (H)      senna-docusate (SENOKOT-S/PERICOLACE) 8.6-50 MG tablet Take 1 tablet by mouth 2 times daily as needed for constipation  Qty: 20 tablet, Refills: 0    Associated Diagnoses: Constipation, unspecified constipation type           CONTINUE these medications which have NOT CHANGED    Details   ACETAMINOPHEN PO Take 650 mg by mouth every 4 hours as needed for pain      albuterol (PROAIR HFA/PROVENTIL HFA/VENTOLIN HFA) 108 (90 Base) MCG/ACT  "inhaler Inhale 2 puffs into the lungs every 6 hours as needed    Comments: Pharmacy may dispense brand covered by insurance (Proair, or proventil or ventolin or generic albuterol inhaler)      aspirin (ASA) 325 MG tablet Take 325 mg by mouth daily      cetirizine (ZYRTEC) 10 MG tablet Take 10 mg by mouth daily      diltiazem ER (DILT-XR) 240 MG 24 hr ER beaded capsule Take 240 mg by mouth every evening      fluticasone (FLONASE) 50 MCG/ACT nasal spray Spray 1 spray into both nostrils 2 times daily      ipratropium (ATROVENT) 0.06 % nasal spray Spray 2 sprays into both nostrils 2 times daily      losartan (COZAAR) 100 MG tablet Take 100 mg by mouth daily      metFORMIN (GLUCOPHAGE-XR) 500 MG 24 hr tablet Take 1,000 mg by mouth daily (with dinner)      mometasone-formoterol (DULERA) 200-5 MCG/ACT inhaler Inhale 2 puffs into the lungs 2 times daily      montelukast (SINGULAIR) 10 MG tablet Take 10 mg by mouth every morning      pregabalin (LYRICA) 150 MG capsule Take 150 mg by mouth 3 times daily      tiotropium (SPIRIVA) 18 MCG inhaled capsule Inhale 18 mcg into the lungs daily      blood glucose (NO BRAND SPECIFIED) test strip Dispense one touch ultra strips. Test 2 times per day ** Pharmacy allowed to substitute per Patients Insurance.      ONETOUCH DELICA LANCETS 33G MISC Dispense lancets to fit device. Test 2 times per day ** Pharmacy allowed to substitute per patient's insurance.               Allergies:         Allergies   Allergen Reactions    Beta Adrenergic Blockers Other (See Comments)     Allergy injections           Consultations This Hospital Stay:      Consultation during this admission received from neurosurgery        Condition and Physical on Discharge:        Discharge condition: Stable   Vitals: Blood pressure 114/68, pulse 87, temperature 97.5  F (36.4  C), temperature source Oral, resp. rate 16, height 1.702 m (5' 7\"), weight 127 kg (280 lb), SpO2 92 %.     Constitutional: Alert, awake and " oriented X 3; resting comfortably in no apparent distress; morbidly obese         Oral cavity: Moist mucosa   Cardiovascular: Normal s1 s2, regular rate and rhythm, no murmur   Lungs: B/l clear to auscultation, no wheezes or crepitations   Abdomen: Soft, nt, nd, no guarding, rigidity or rebound; BS +   LE : No edema   Musculoskeletal/Neuro Power 5/5 in all extremities; No focal neurological deficits noted   Psychiatry: normal mood and affect             Discharge Time:      Greater than 30 minutes.        Image Results From This Hospital Stay (For Non-EPIC Providers):        Results for orders placed or performed during the hospital encounter of 07/27/23   CT Cervical Spine w/o Contrast    Narrative    CT CERVICAL SPINE WITHOUT CONTRAST  7/27/2023 7:41 AM     HISTORY: Fall, neck pain.     TECHNIQUE: Axial images of the cervical spine were obtained without  intravenous contrast. Multiplanar reformations were performed.   Radiation dose for this scan was reduced using automated exposure  control, adjustment of the mA and/or kV according to patient size, or  iterative reconstruction technique.    COMPARISON: None.    FINDINGS: Limited exam due to beam hardening artifact from high-riding  shoulders and overlapping soft tissues. No fracture is identified.    Multilevel moderate degenerative disc disease. Multilevel mild facet  arthropathy. Disc osteophyte complexes and ossification of posterior  longitudinal ligament contribute to mild spinal canal stenosis at  C2-C3, moderate to severe spinal canal stenosis at C3-C4, severe  spinal canal stenosis at C4-C5, severe spinal canal stenosis at C5-C6,  mild to moderate spinal canal stenosis at C6-C7, and mild spinal canal  stenosis at C7-T1. Severe foraminal stenoses at multiple levels  bilaterally.    Scattered vascular calcifications. Left cerebellar infarct, age  indeterminate.      Impression    IMPRESSION:   1. No fracture identified.  2. Degenerative/incidental findings  as detailed.    THONY HILTON MD         SYSTEM ID:  [id number]   CT Chest/Abdomen/Pelvis w Contrast    Narrative    CT CHEST/ABDOMEN/PELVIS WITH CONTRAST 7/27/2023 7:50 AM    CLINICAL HISTORY: Fall, chest, right abdomen and back pain.    TECHNIQUE: CT scan of the chest, abdomen, and pelvis was performed  following injection of IV contrast. Multiplanar reformats were  obtained. Dose reduction techniques were used.   CONTRAST: 135 mL [id number]    COMPARISON: None.    FINDINGS:   LUNGS AND PLEURA: No effusion. No pneumothorax. No acute airspace  disease. Mild bibasilar atelectasis. Motion artifact limits detail  assessment of the lungs. A few calcified granulomas noted.    MEDIASTINUM/AXILLAE: No acute mediastinal abnormality. No mediastinal  fluid collection. No acute thoracic aortic abnormality. Mild scattered  thoracic aortic calcifications. No thoracic aortic dissection. No  adenopathy.    CORONARY ARTERY CALCIFICATION: Severe.    HEPATOBILIARY: Small cholelithiasis. Liver shows no acute abnormality.    PANCREAS: Normal.    SPLEEN: Normal.    ADRENAL GLANDS: Normal.    KIDNEYS/BLADDER: No significant mass, stones, or hydronephrosis. There  are simple or benign cysts. No follow up is needed. Bladder appears  unremarkable.    BOWEL: Prominent stool within the proximal colon. No bowel obstruction  or acute inflammatory change. No imaging evidence for appendicitis.  Appendix not seen.    PELVIC ORGANS: Prostate is 5.1 cm. No acute pelvic abnormality.    ADDITIONAL FINDINGS: There is some mild edema at the retroperitoneal  fat. Scattered vascular calcifications. Nonaneurysmal abdominal aorta.    MUSCULOSKELETAL: Acute-appearing fractures at T11 and T12 vertebral  bodies, sagittal series 901 image 82. Old healed left eighth rib  fracture. Diffuse spine degenerative changes. Sacroiliac degenerative  changes bilaterally. No other acute displaced fracture identified.      Impression    IMPRESSION:  1.  Acute-appearing  nondisplaced fractures at T11 and T12 vertebral  bodies. No other visible displaced fractures. However, this  examination is not specifically tailored for spine assessment. Refer  to CT of the spine for further details and other potential occult  fractures.  2.  No other specific acute traumatic abnormality identified.   3.  Cholelithiasis.    OZZY WONG MD         SYSTEM ID:  [id number]   CT Thoracic Spine Reconstructed     Value    Radiologist flags Thoracic vertebral body fracture (Urgent)    Narrative    CT THORACIC SPINE RECONSTRUCTED,   CT LUMBAR SPINE RECONSTRUCTED  7/27/2023 7:49 AM     HISTORY: Fall, neck pain.    COMPARISON: None    TECHNIQUE: Axial, sagittal and coronal of the thoracic and lumbar  spine were created based off the data acquired for the CT of the  chest, abdomen and pelvis. No additional radiation was utilized.  Images were reviewed in bone and soft tissue windows.    CONTRAST: None.    Thoracic spine: Multidirectional fracture through the T12 vertebral  body, associated anterior bridging osteophyte, extending posteriorly  through the right T11 facet joint extending, bilateral lamina and  spinous process. Patent spinal canal and neural foramina.    Lumbar spine: There are 5 lumbar type vertebrae, used for the purposes  of this dictation. No acute fracture or traumatic subluxation. No  suspicious osseous lesion. Nonfocal extraspinal structures.     On a level by level basis, the findings are as follows:    L1-L2: Prominent anterior bridging osteophytes. Disc bulge. Bilateral  facet hypertrophy. No significant spinal canal or neural foraminal  stenosis.    L2-L3: Prominent anterior bridging osteophytes. Disc bulge. Bilateral  facet hypertrophy. No significant spinal canal or neural foraminal  stenosis.    L3-L4: Disc osteophyte complex anterolaterally and within the right  neural foramen causing mild to moderate right neural foraminal  stenosis. Patent spinal canal and left neural  foramen.    L4-L5: Disc osteophyte complex. Prominent left lateral bridging  osteophytes. Bilateral facet hypertrophy causing mild to moderate  right and mild left neural foraminal stenosis. Patent spinal canal.    L5-S1: Disc osteophyte complex. Prominent anterior bridging  osteophytes. Bilateral facet hypertrophy causing mild to moderate left  neural foraminal stenosis. Patent spinal canal and right neural  foramen.      Impression    IMPRESSION:  1. Three column injury with fractures involving the T12 vertebral  body, extending to the right T11 facet and T11 bilateral lamina. No  retropulsed fragments into the spinal canal. No high-grade canal or  neural foraminal stenosis.  2. No fracture or traumatic subluxation of the lumbar spine.  Multilevel lumbar spondylosis. No high-grade canal stenosis.  3. See same day/same time as CT of the chest, abdomen and pelvis for  those images and report.    [Urgent Result: Thoracic vertebral body fracture]    Finding was identified on 7/27/2023 8:07 AM.     Dr. Sharma was contacted by Dr. Donato at 7/27/2023 8:22 AM and  verbalized understanding of the urgent finding.     LUIS DONATO,          SYSTEM ID:  [id number]   CT Lumbar Spine Reconstructed     Value    Radiologist flags Thoracic vertebral body fracture (Urgent)    Narrative    CT THORACIC SPINE RECONSTRUCTED,   CT LUMBAR SPINE RECONSTRUCTED  7/27/2023 7:49 AM     HISTORY: Fall, neck pain.    COMPARISON: None    TECHNIQUE: Axial, sagittal and coronal of the thoracic and lumbar  spine were created based off the data acquired for the CT of the  chest, abdomen and pelvis. No additional radiation was utilized.  Images were reviewed in bone and soft tissue windows.    CONTRAST: None.    Thoracic spine: Multidirectional fracture through the T12 vertebral  body, associated anterior bridging osteophyte, extending posteriorly  through the right T11 facet joint extending, bilateral lamina and  spinous process. Patent spinal canal  and neural foramina.    Lumbar spine: There are 5 lumbar type vertebrae, used for the purposes  of this dictation. No acute fracture or traumatic subluxation. No  suspicious osseous lesion. Nonfocal extraspinal structures.     On a level by level basis, the findings are as follows:    L1-L2: Prominent anterior bridging osteophytes. Disc bulge. Bilateral  facet hypertrophy. No significant spinal canal or neural foraminal  stenosis.    L2-L3: Prominent anterior bridging osteophytes. Disc bulge. Bilateral  facet hypertrophy. No significant spinal canal or neural foraminal  stenosis.    L3-L4: Disc osteophyte complex anterolaterally and within the right  neural foramen causing mild to moderate right neural foraminal  stenosis. Patent spinal canal and left neural foramen.    L4-L5: Disc osteophyte complex. Prominent left lateral bridging  osteophytes. Bilateral facet hypertrophy causing mild to moderate  right and mild left neural foraminal stenosis. Patent spinal canal.    L5-S1: Disc osteophyte complex. Prominent anterior bridging  osteophytes. Bilateral facet hypertrophy causing mild to moderate left  neural foraminal stenosis. Patent spinal canal and right neural  foramen.      Impression    IMPRESSION:  1. Three column injury with fractures involving the T12 vertebral  body, extending to the right T11 facet and T11 bilateral lamina. No  retropulsed fragments into the spinal canal. No high-grade canal or  neural foraminal stenosis.  2. No fracture or traumatic subluxation of the lumbar spine.  Multilevel lumbar spondylosis. No high-grade canal stenosis.  3. See same day/same time as CT of the chest, abdomen and pelvis for  those images and report.    [Urgent Result: Thoracic vertebral body fracture]    Finding was identified on 7/27/2023 8:07 AM.     Dr. Sharma was contacted by Dr. Donato at 7/27/2023 8:22 AM and  verbalized understanding of the urgent finding.     LUIS DONATO DO         SYSTEM ID:  [id number]    Thoracic spine MRI w/o contrast    Narrative    EXAM: MR THORACIC SPINE W/O CONTRAST  7/27/2023 12:41 PM     HISTORY: T11 and T12 fractures after fall       COMPARISON: Same-day CT    TECHNIQUE: Multiplanar, multisequence MRI images of the thoracic spine  were obtained without intravenous contrast.    CONTRAST: None.    FINDINGS:  Redemonstrated T12 vertebral body, and associated anterior bridging  fracture. Fracture through the T11 posterior elements not well  visualized on MRI however there is corresponding ill-defined T2  hyperintense signal in this region. No signal changes within the cord  to suggest cord contusion. No substantial epidural hematoma. No  significant canal stenosis. Prominent ventrally oriented facet  osteophytes at T2-T3, T3-T4 which causes mild focal canal stenosis.  Nonfocal extraspinal structures.       Impression    IMPRESSION: Redemonstration of three column T12 vertebral body and T11  posterior element fractures. No cord contusion.    LUIS GONCALVES DO         SYSTEM ID:  [id number]   XR Thoracic Lumbar Standing 2 Views    Narrative    EXAM: XR THORACIC LUMBAR STANDING 2 VIEWS  LOCATION: [facility]  DATE: 7/28/2023    INDICATION: recumbent and upright xray to evaluate T11 12 fracture  COMPARISON: CT thoracic and lumbar spines 07/27/2023      Impression    IMPRESSION:   T12 vertebral body and T11 posterior element acute fractures seen on recent CT not visualized on current radiographs.    Vertebral heights maintained. No significant subluxations. Bilateral sacroiliac joints intact.    Multilevel spondylosis.            Most Recent Lab Results In EPIC (For Non-EPIC Providers):    Most Recent 3 CBC's:  Recent Labs   Lab Test 07/27/23  0653 11/06/18  0930   WBC 9.9 9.6   HGB 12.4* 10.4*   MCV 93 97    313      Most Recent 3 BMP's:  Recent Labs   Lab Test 07/27/23  0653 11/19/18  1450 11/16/18  1150   * 137 135*   POTASSIUM 4.2 4.5 4.7   CHLORIDE 102 101  101   CO2 18* 28 24   BUN 19.9 19 32*   CR 1.11 0.89 1.24   ANIONGAP 15 8 10   HARIKA 8.8 9.9 10.0   * 182* 196*     Most Recent 3 Troponin's:No lab results found.    Invalid input(s): TROP, TROPONINIES  Most Recent 3 INR's:No lab results found.  Most Recent 2 LFT's:  Recent Labs   Lab Test 07/27/23  0653   AST 11   ALT 14   ALKPHOS 95   BILITOTAL 0.6     Most Recent Cholesterol Panel:No lab results found.  Most Recent 6 Bacteria Isolates From Any Culture (See EPIC Reports for Culture Details):No lab results found.  Most Recent TSH, T4 and HgbA1c: No lab results found.

## 2023-07-30 NOTE — PROGRESS NOTES
Orientation/Cognitive: A&O X4  Observation Goals (Met/ Not Met): not met  Mobility Level/Assist Equipment: A1 GB/W  Fall Risk (Y/N): Y  Behavior Concerns: green  Pain Management: Scheduled tylenol X1   Tele/VS/O2: VSS Using CPAP.   ABNL Lab/BG: None  Diet: Reg  Bowel/Bladder: Continent  Skin Concerns: Blanchable redness on coccyx    Drains/Devices: PIV SL  Tests/Procedures for next shift: SW working on home care/ home Pt or Outpatient Pt  Anticipated DC date & active delays: TBD pending SW finding appropriate cares for patient in home.

## 2023-07-30 NOTE — PLAN OF CARE
Observation goals:  -diagnostic tests and consults completed and resulted- met  -vital signs normal or at patient baseline - met  -adequate pain control on oral analgesics - met   -returns to baseline functional status - not met, PT rec HH PT  -safe disposition plan has been identified - not met, CM following  -evaluation and clearance by neurosurgery - met

## 2023-07-30 NOTE — PLAN OF CARE
Physical Therapy Discharge Summary    Reason for therapy discharge:    Discharged to home with outpatient therapy.    Progress towards therapy goal(s). See goals on Care Plan in [facility] electronic health record for goal details.  Goals partially met.  Barriers to achieving goals:   discharge from facility.    Therapy recommendation(s):    Continued therapy is recommended.  Rationale/Recommendations: Pt below baseline mobility but moving well. Pt lives alone in an apartment. Typically independent w/o AD. Currently SBA w/ mobility w/o AD. Anticipate can DC to home once medically ready w/ HH PT to improve upon functional mobility and independence.  PT Brief overview of current status: CGA x1 bed mobility, SBA w/o AD for mobility      Recommendation above provided by last treating therapist.

## 2023-09-13 ENCOUNTER — ANCILLARY PROCEDURE (OUTPATIENT)
Dept: GENERAL RADIOLOGY | Facility: CLINIC | Age: 63
End: 2023-09-13
Attending: PHYSICIAN ASSISTANT
Payer: COMMERCIAL

## 2023-09-13 ENCOUNTER — OFFICE VISIT (OUTPATIENT)
Dept: NEUROSURGERY | Facility: CLINIC | Age: 63
End: 2023-09-13
Payer: COMMERCIAL

## 2023-09-13 VITALS — HEART RATE: 67 BPM | DIASTOLIC BLOOD PRESSURE: 90 MMHG | SYSTOLIC BLOOD PRESSURE: 153 MMHG | OXYGEN SATURATION: 93 %

## 2023-09-13 DIAGNOSIS — S22.089A CLOSED FRACTURE OF TWELFTH THORACIC VERTEBRA, UNSPECIFIED FRACTURE MORPHOLOGY, INITIAL ENCOUNTER (H): Primary | ICD-10-CM

## 2023-09-13 DIAGNOSIS — S22.089A CLOSED FRACTURE OF ELEVENTH THORACIC VERTEBRA, UNSPECIFIED FRACTURE MORPHOLOGY, INITIAL ENCOUNTER (H): ICD-10-CM

## 2023-09-13 PROCEDURE — 99213 OFFICE O/P EST LOW 20 MIN: CPT | Performed by: PHYSICIAN ASSISTANT

## 2023-09-13 PROCEDURE — 72070 X-RAY EXAM THORAC SPINE 2VWS: CPT

## 2023-09-13 RX ORDER — ATORVASTATIN CALCIUM 80 MG/1
TABLET, FILM COATED ORAL
COMMUNITY
Start: 2023-07-11

## 2023-09-13 ASSESSMENT — PAIN SCALES - GENERAL: PAINLEVEL: NO PAIN (1)

## 2023-09-13 NOTE — LETTER
9/13/2023         RE: Vargas Dent  [address]  [place] [address]        Dear Colleague,    Thank you for referring your patient, Vargas Dent, to the [facility]. Please see a copy of my visit note below.    Neurosurgery follow-up     Was a 63-year-old male who presents to clinic for follow-up from a fall resulting in T12 and T11 fractures.  Patient states his pain is improving, denies any radicular symptoms numbness or weakness in his lower extremities.  He states he wears a brace occasionally but not as much as he was recommended to.    Exam    B/P: 153/90, T: Data Unavailable, P: 67, R: Data Unavailable     Alert and oriented no acute distress  Very mild tenderness in the thoracic spine near the thoracolumbar junction  No tenderness to percussion of the thoracic or lumbar spine  Bilateral lower extremities with appropriate strength  Gait is normal    Imaging    IMPRESSION: Moderate thoracic kyphosis. The vertebral bodies of the thoracic spine otherwise have normal stature and alignment without evidence of compression fracture. The disc spaces are well-maintained. No other significant degenerative changes. No   prevertebral soft tissue swelling. The partially imaged lung apices are unremarkable. Soft tissues unremarkable.  Addendum/  impression:     Examination was compared to prior CT 07/27/2023 and radiograph on 07/28/2023. Known inferior end plate of T12 fracture is less conspicuous by radiograph.. Thoracic kyphosis and alignment are unchanged compared to prior examination. Bulky anterior   marginal osteophytes and diffuse idiopathic skeletal hyperostosis  unchanged.    Assessment    3 column fracture of T12 and T11      Plan    Recommend follow-up in 6 weeks with repeat thoracic x-ray prior.  Continue to wear brace as directed when out of bed.        Again, thank you for allowing me to participate in the care of your patient.         Sincerely,        Gabo Lewis PA-C

## 2023-09-13 NOTE — NURSING NOTE
"Vargas Dent is a 63 year old male who presents for:  Chief Complaint   Patient presents with    RECHECK        Initial Vitals:  BP (!) 153/90   Pulse 67   SpO2 93%  Estimated body mass index is 43.85 kg/m  as calculated from the following:    Height as of 7/27/23: 5' 7\" (1.702 m).    Weight as of 7/27/23: 280 lb (127 kg).. There is no height or weight on file to calculate BSA. BP completed using cuff size: regular  No Pain (1)    Pavel Lee      "

## 2023-09-13 NOTE — PROGRESS NOTES
Neurosurgery follow-up     Was a 63-year-old male who presents to clinic for follow-up from a fall resulting in T12 and T11 fractures.  Patient states his pain is improving, denies any radicular symptoms numbness or weakness in his lower extremities.  He states he wears a brace occasionally but not as much as he was recommended to.    Exam    B/P: 153/90, T: Data Unavailable, P: 67, R: Data Unavailable     Alert and oriented no acute distress  Very mild tenderness in the thoracic spine near the thoracolumbar junction  No tenderness to percussion of the thoracic or lumbar spine  Bilateral lower extremities with appropriate strength  Gait is normal    Imaging    IMPRESSION: Moderate thoracic kyphosis. The vertebral bodies of the thoracic spine otherwise have normal stature and alignment without evidence of compression fracture. The disc spaces are well-maintained. No other significant degenerative changes. No   prevertebral soft tissue swelling. The partially imaged lung apices are unremarkable. Soft tissues unremarkable.  Addendum/  impression:     Examination was compared to prior CT 07/27/2023 and radiograph on 07/28/2023. Known inferior end plate of T12 fracture is less conspicuous by radiograph.. Thoracic kyphosis and alignment are unchanged compared to prior examination. Bulky anterior   marginal osteophytes and diffuse idiopathic skeletal hyperostosis  unchanged.    Assessment    3 column fracture of T12 and T11      Plan    Recommend follow-up in 6 weeks with repeat thoracic x-ray prior.  Continue to wear brace as directed when out of bed.

## 2023-09-15 ENCOUNTER — TELEPHONE (OUTPATIENT)
Dept: NEUROSURGERY | Facility: CLINIC | Age: 63
End: 2023-09-15
Payer: COMMERCIAL

## 2023-09-22 NOTE — TELEPHONE ENCOUNTER
Left second message to schedule 6 week post hospital visit along with Thoracic imaging prior to appt.

## 2023-09-25 NOTE — TELEPHONE ENCOUNTER
3rd attempt to schedule 6 week hospital follow up along with thoracic imaging prior to appointment.

## 2023-11-09 ENCOUNTER — TELEPHONE (OUTPATIENT)
Dept: NEUROSURGERY | Facility: CLINIC | Age: 63
End: 2023-11-09
Payer: COMMERCIAL

## 2023-11-09 NOTE — TELEPHONE ENCOUNTER
Message left for patient to call clinic back regarding appointment on 11/14/23. Need to reschedule to Essie Castellon, same day in morning due to conflict in schedule. Patient's XR will also need to be rescheduled.

## 2023-11-21 ENCOUNTER — OFFICE VISIT (OUTPATIENT)
Dept: NEUROSURGERY | Facility: CLINIC | Age: 63
End: 2023-11-21
Payer: COMMERCIAL

## 2023-11-21 ENCOUNTER — ANCILLARY PROCEDURE (OUTPATIENT)
Dept: GENERAL RADIOLOGY | Facility: CLINIC | Age: 63
End: 2023-11-21
Attending: PHYSICIAN ASSISTANT
Payer: COMMERCIAL

## 2023-11-21 VITALS — OXYGEN SATURATION: 95 % | DIASTOLIC BLOOD PRESSURE: 79 MMHG | HEART RATE: 75 BPM | SYSTOLIC BLOOD PRESSURE: 146 MMHG

## 2023-11-21 DIAGNOSIS — S22.089A CLOSED FRACTURE OF TWELFTH THORACIC VERTEBRA, UNSPECIFIED FRACTURE MORPHOLOGY, INITIAL ENCOUNTER (H): ICD-10-CM

## 2023-11-21 DIAGNOSIS — S22.089D CLOSED FRACTURE OF TWELFTH THORACIC VERTEBRA WITH ROUTINE HEALING, UNSPECIFIED FRACTURE MORPHOLOGY, SUBSEQUENT ENCOUNTER: Primary | ICD-10-CM

## 2023-11-21 PROCEDURE — 72080 X-RAY EXAM THORACOLMB 2/> VW: CPT

## 2023-11-21 PROCEDURE — 99213 OFFICE O/P EST LOW 20 MIN: CPT

## 2023-11-21 RX ORDER — IBUPROFEN 800 MG/1
800 TABLET, FILM COATED ORAL EVERY 8 HOURS PRN
COMMUNITY
Start: 2023-10-13

## 2023-11-21 ASSESSMENT — PAIN SCALES - GENERAL: PAINLEVEL: MILD PAIN (2)

## 2023-11-21 NOTE — PROGRESS NOTES
[facility] Neurosurgery Clinic Visit      HPI: Vargas Dent is a 63 year old male who presents for follow up of T12 and T11 fractures. Patient originally evaluated on 7/27/23 following mechanical fall. He has been instructed to wear his TLSO when out of bed, however he does not remember being advised of this at his last follow up in September so he has not been wearing his brace. He has been doing physical therapy for generalized muscle weakness and balance, about every other week, to hopefully help prevent future falls. He does have baseline LUE and LLE weakness, as well as decreased sensation in RUE and RLE, from a stroke a few years ago, however he does not note any increasing numbness, weakness, or bowel of bladder dysfunction. He has had some left hip pain and sciatic type symptoms that he has dealt with off and on for a few years, but again no new radicular symptoms. He did fall about a month ago again, but fell forward onto his hands and knees. He was checked out at the ER, but denies any increasing back pain or discomfort. He does report on and off more muscular type discomfort in the mid to lower back, but denies any new or worsening back pain.    Past Medical History:   Diagnosis Date    CVA (cerebral infarction)     Hyperlipidemia     Hypertension        Past Medical History reviewed with patient during visit.    No past surgical history on file.  Past Surgical History reviewed with patient during visit.    Current Outpatient Medications   Medication    ACETAMINOPHEN PO    albuterol (PROAIR HFA/PROVENTIL HFA/VENTOLIN HFA) 108 (90 Base) MCG/ACT inhaler    aspirin (ASA) 325 MG tablet    atorvastatin (LIPITOR) 80 MG tablet    blood glucose (NO BRAND SPECIFIED) test strip    cetirizine (ZYRTEC) 10 MG tablet    cyclobenzaprine (FLEXERIL) 10 MG tablet    diltiazem ER (DILT-XR) 240 MG 24 hr ER beaded capsule    fluticasone (FLONASE) 50 MCG/ACT nasal spray    ipratropium (ATROVENT) 0.06 % nasal spray     Lidocaine (LIDOCARE) 4 % Patch    losartan (COZAAR) 100 MG tablet    metFORMIN (GLUCOPHAGE-XR) 500 MG 24 hr tablet    mometasone-formoterol (DULERA) 200-5 MCG/ACT inhaler    montelukast (SINGULAIR) 10 MG tablet    ONETOUCH DELICA LANCETS 33G MISC    oxyCODONE (ROXICODONE) 5 MG tablet    pregabalin (LYRICA) 150 MG capsule    senna-docusate (SENOKOT-S/PERICOLACE) 8.6-50 MG tablet    tiotropium (SPIRIVA) 18 MCG inhaled capsule     No current facility-administered medications for this visit.       Allergies   Allergen Reactions    Beta Adrenergic Blockers Other (See Comments)     Allergy injections       Social History     Socioeconomic History    Marital status: Single   Tobacco Use    Smoking status: Never    Smokeless tobacco: Never       No family history on file.    ROS: 10 point ROS neg other than the symptoms noted above in the HPI.    Vital Signs: There were no vitals taken for this visit.    Neurological Examination:  Awake  Alert  Oriented x 3  Speech clear    Strength in LUE and LLE 4+/5, strength in RUE and RLE 5/5  Sensation decreased in RUE and RLE, but intact in LUE and LLE    Musculoskeletal:  Gait: Able to stand from a seated position. Cannot heal/toe walk. He can perform one heal and one toe rise. Baseline balance difficulties due to stroke.   Mild muscular tenderness in paraspinal muscles of lumbar and thoracic spine, not TTP over midline thoracic or lumbar spine.    Imaging:   Thoracolumbar XR completed 11/21/23 reviewed and compared to thoracic XR on 9/13/23 and show no significant change. Radiology report pending.     Assessment/Plan:   Vargas Dent is a 63 year old male who presents for follow up of T12 and T11 fractures. Encouraged him to continue with physical therapy, and advised they can now start doing some back and core strengthening. He can gradually increase activity level at this point as it is about 4mos from original injury and he has not had any new or worsening symptoms. Patient  can follow up at this time on an as needed basis.     Advised patient to call our clinic with any questions or concerns. Discussed red flag symptoms and advised to seek medical attention if these develop. Patient voiced understanding and agreement.      Essie Castellon PA-C  [facility] Neurosurgery  [address] [address]

## 2023-11-21 NOTE — NURSING NOTE
"Vargas Dent is a 63 year old male who presents for:  Chief Complaint   Patient presents with    RECHECK     12 week hospital F/U - PT states they are feeling better, along with getting cortisone injections on 11/20 have helped pain a lot        Initial Vitals:  BP (!) 146/79   Pulse 75   SpO2 95%  Estimated body mass index is 43.85 kg/m  as calculated from the following:    Height as of 7/27/23: 5' 7\" (1.702 m).    Weight as of 7/27/23: 280 lb (127 kg).. There is no height or weight on file to calculate BSA. BP completed using cuff size: regular  Mild Pain (2)    Nursing Comments:       Rosa Maria Judd    "

## 2023-11-21 NOTE — LETTER
11/21/2023         RE: Vargas Dent  [address]  [place] [facility] [address]        Dear Colleague,    Thank you for referring your patient, Vargas Dent, to the [facility]OLOG[facility]. Please see a copy of my visit note below.    [facility] Neurosurgery Clinic Visit      HPI: Vargas Dent is a 63 year old male who presents for follow up of T12 and T11 fractures. Patient originally evaluated on 7/27/23 following mechanical fall. He has been instructed to wear his TLSO when out of bed, however he does not remember being advised of this at his last follow up in September so he has not been wearing his brace. He has been doing physical therapy for generalized muscle weakness and balance, about every other week, to hopefully help prevent future falls. He does have baseline LUE and LLE weakness, as well as decreased sensation in RUE and RLE, from a stroke a few years ago, however he does not note any increasing numbness, weakness, or bowel of bladder dysfunction. He has had some left hip pain and sciatic type symptoms that he has dealt with off and on for a few years, but again no new radicular symptoms. He did fall about a month ago again, but fell forward onto his hands and knees. He was checked out at the ER, but denies any increasing back pain or discomfort. He does report on and off more muscular type discomfort in the mid to lower back, but denies any new or worsening back pain.    Past Medical History:   Diagnosis Date     CVA (cerebral infarction)      Hyperlipidemia      Hypertension        Past Medical History reviewed with patient during visit.    No past surgical history on file.  Past Surgical History reviewed with patient during visit.    Current Outpatient Medications   Medication     ACETAMINOPHEN PO     albuterol (PROAIR HFA/PROVENTIL HFA/VENTOLIN HFA) 108 (90 Base) MCG/ACT inhaler     aspirin (ASA) 325 MG tablet     atorvastatin (LIPITOR) 80 MG tablet      blood glucose (NO BRAND SPECIFIED) test strip     cetirizine (ZYRTEC) 10 MG tablet     cyclobenzaprine (FLEXERIL) 10 MG tablet     diltiazem ER (DILT-XR) 240 MG 24 hr ER beaded capsule     fluticasone (FLONASE) 50 MCG/ACT nasal spray     ipratropium (ATROVENT) 0.06 % nasal spray     Lidocaine (LIDOCARE) 4 % Patch     losartan (COZAAR) 100 MG tablet     metFORMIN (GLUCOPHAGE-XR) 500 MG 24 hr tablet     mometasone-formoterol (DULERA) 200-5 MCG/ACT inhaler     montelukast (SINGULAIR) 10 MG tablet     ONETOUCH DELICA LANCETS 33G MISC     oxyCODONE (ROXICODONE) 5 MG tablet     pregabalin (LYRICA) 150 MG capsule     senna-docusate (SENOKOT-S/PERICOLACE) 8.6-50 MG tablet     tiotropium (SPIRIVA) 18 MCG inhaled capsule     No current facility-administered medications for this visit.       Allergies   Allergen Reactions     Beta Adrenergic Blockers Other (See Comments)     Allergy injections       Social History     Socioeconomic History     Marital status: Single   Tobacco Use     Smoking status: Never     Smokeless tobacco: Never       No family history on file.    ROS: 10 point ROS neg other than the symptoms noted above in the HPI.    Vital Signs: There were no vitals taken for this visit.    Neurological Examination:  Awake  Alert  Oriented x 3  Speech clear    Strength in LUE and LLE 4+/5, strength in RUE and RLE 5/5  Sensation decreased in RUE and RLE, but intact in LUE and LLE    Musculoskeletal:  Gait: Able to stand from a seated position. Cannot heal/toe walk. He can perform one heal and one toe rise. Baseline balance difficulties due to stroke.   Mild muscular tenderness in paraspinal muscles of lumbar and thoracic spine, not TTP over midline thoracic or lumbar spine.    Imaging:   Thoracolumbar XR completed 11/21/23 reviewed and compared to thoracic XR on 9/13/23 and show no significant change. Radiology report pending.     Assessment/Plan:   Vargas Dent is a 63 year old male who presents for follow up of T12  and T11 fractures. Encouraged him to continue with physical therapy, and advised they can now start doing some back and core strengthening. He can gradually increase activity level at this point as it is about 4mos from original injury and he has not had any new or worsening symptoms. Patient can follow up at this time on an as needed basis.     Advised patient to call our clinic with any questions or concerns. Discussed red flag symptoms and advised to seek medical attention if these develop. Patient voiced understanding and agreement.      Essie Castellon PA-C  [facility] [place] [address]        Again, thank you for allowing me to participate in the care of your patient.        Sincerely,        ESSIE CASTELLON PA-C
